# Patient Record
Sex: FEMALE | Race: WHITE | NOT HISPANIC OR LATINO
[De-identification: names, ages, dates, MRNs, and addresses within clinical notes are randomized per-mention and may not be internally consistent; named-entity substitution may affect disease eponyms.]

---

## 2017-01-31 ENCOUNTER — RX RENEWAL (OUTPATIENT)
Age: 62
End: 2017-01-31

## 2017-03-22 ENCOUNTER — MEDICATION RENEWAL (OUTPATIENT)
Age: 62
End: 2017-03-22

## 2017-03-23 ENCOUNTER — RX RENEWAL (OUTPATIENT)
Age: 62
End: 2017-03-23

## 2017-04-28 ENCOUNTER — RX RENEWAL (OUTPATIENT)
Age: 62
End: 2017-04-28

## 2017-10-30 ENCOUNTER — APPOINTMENT (OUTPATIENT)
Dept: INTERNAL MEDICINE | Facility: CLINIC | Age: 62
End: 2017-10-30

## 2017-12-12 ENCOUNTER — APPOINTMENT (OUTPATIENT)
Dept: INTERNAL MEDICINE | Facility: CLINIC | Age: 62
End: 2017-12-12
Payer: COMMERCIAL

## 2017-12-12 VITALS
HEIGHT: 59 IN | WEIGHT: 104 LBS | BODY MASS INDEX: 20.96 KG/M2 | HEART RATE: 79 BPM | DIASTOLIC BLOOD PRESSURE: 79 MMHG | SYSTOLIC BLOOD PRESSURE: 130 MMHG | OXYGEN SATURATION: 98 %

## 2017-12-12 PROCEDURE — 99396 PREV VISIT EST AGE 40-64: CPT

## 2017-12-20 LAB
25(OH)D3 SERPL-MCNC: 31 NG/ML
ALBUMIN SERPL ELPH-MCNC: 4.8 G/DL
ALP BLD-CCNC: 56 U/L
ALT SERPL-CCNC: 24 U/L
ANION GAP SERPL CALC-SCNC: 13 MMOL/L
AST SERPL-CCNC: 32 U/L
BASOPHILS # BLD AUTO: 0.02 K/UL
BASOPHILS NFR BLD AUTO: 0.2 %
BILIRUB SERPL-MCNC: 0.4 MG/DL
BUN SERPL-MCNC: 14 MG/DL
CALCIUM SERPL-MCNC: 9.7 MG/DL
CHLORIDE SERPL-SCNC: 99 MMOL/L
CHOLEST SERPL-MCNC: 200 MG/DL
CHOLEST/HDLC SERPL: 2.1 RATIO
CO2 SERPL-SCNC: 27 MMOL/L
CREAT SERPL-MCNC: 0.78 MG/DL
EOSINOPHIL # BLD AUTO: 0.07 K/UL
EOSINOPHIL NFR BLD AUTO: 0.9 %
GLUCOSE SERPL-MCNC: 85 MG/DL
HBA1C MFR BLD HPLC: 5.6 %
HCT VFR BLD CALC: 37.5 %
HDLC SERPL-MCNC: 96 MG/DL
HGB BLD-MCNC: 12.1 G/DL
IMM GRANULOCYTES NFR BLD AUTO: 0.1 %
LDLC SERPL CALC-MCNC: 90 MG/DL
LYMPHOCYTES # BLD AUTO: 3.4 K/UL
LYMPHOCYTES NFR BLD AUTO: 41.6 %
MAN DIFF?: NORMAL
MCHC RBC-ENTMCNC: 30.5 PG
MCHC RBC-ENTMCNC: 32.3 GM/DL
MCV RBC AUTO: 94.5 FL
MONOCYTES # BLD AUTO: 0.52 K/UL
MONOCYTES NFR BLD AUTO: 6.4 %
NEUTROPHILS # BLD AUTO: 4.16 K/UL
NEUTROPHILS NFR BLD AUTO: 50.8 %
PLATELET # BLD AUTO: 308 K/UL
POTASSIUM SERPL-SCNC: 4.7 MMOL/L
PROT SERPL-MCNC: 7.7 G/DL
RBC # BLD: 3.97 M/UL
RBC # FLD: 14.2 %
SODIUM SERPL-SCNC: 139 MMOL/L
T3 SERPL-MCNC: 93 NG/DL
T3FREE SERPL-MCNC: 2.95 PG/ML
T4 FREE SERPL-MCNC: 1.1 NG/DL
T4 SERPL-MCNC: 5.8 UG/DL
TRIGL SERPL-MCNC: 69 MG/DL
TSH SERPL-ACNC: 1.64 UIU/ML
WBC # FLD AUTO: 8.18 K/UL

## 2018-06-20 ENCOUNTER — MEDICATION RENEWAL (OUTPATIENT)
Age: 63
End: 2018-06-20

## 2018-10-20 ENCOUNTER — MOBILE ON CALL (OUTPATIENT)
Age: 63
End: 2018-10-20

## 2018-10-20 RX ORDER — ALBUTEROL SULFATE 90 UG/1
108 (90 BASE) AEROSOL, METERED RESPIRATORY (INHALATION)
Qty: 1 | Refills: 5 | Status: ACTIVE | COMMUNITY
Start: 2017-01-31 | End: 1900-01-01

## 2018-10-24 DIAGNOSIS — R92.8 OTHER ABNORMAL AND INCONCLUSIVE FINDINGS ON DIAGNOSTIC IMAGING OF BREAST: ICD-10-CM

## 2018-12-13 ENCOUNTER — FORM ENCOUNTER (OUTPATIENT)
Age: 63
End: 2018-12-13

## 2018-12-14 ENCOUNTER — APPOINTMENT (OUTPATIENT)
Dept: INTERNAL MEDICINE | Facility: CLINIC | Age: 63
End: 2018-12-14
Payer: COMMERCIAL

## 2018-12-14 ENCOUNTER — APPOINTMENT (OUTPATIENT)
Dept: RADIOLOGY | Facility: IMAGING CENTER | Age: 63
End: 2018-12-14
Payer: COMMERCIAL

## 2018-12-14 ENCOUNTER — OUTPATIENT (OUTPATIENT)
Dept: OUTPATIENT SERVICES | Facility: HOSPITAL | Age: 63
LOS: 1 days | End: 2018-12-14
Payer: COMMERCIAL

## 2018-12-14 VITALS
WEIGHT: 96 LBS | OXYGEN SATURATION: 99 % | DIASTOLIC BLOOD PRESSURE: 60 MMHG | HEIGHT: 59 IN | BODY MASS INDEX: 19.35 KG/M2 | SYSTOLIC BLOOD PRESSURE: 120 MMHG | HEART RATE: 62 BPM

## 2018-12-14 DIAGNOSIS — R63.4 ABNORMAL WEIGHT LOSS: ICD-10-CM

## 2018-12-14 DIAGNOSIS — J45.991 COUGH VARIANT ASTHMA: ICD-10-CM

## 2018-12-14 DIAGNOSIS — R63.0 ANOREXIA: ICD-10-CM

## 2018-12-14 DIAGNOSIS — R76.12 NONSPECIFIC REACTION TO CELL MEDIATED IMMUNITY MEASUREMENT OF GAMMA INTERFERON ANTIGEN RESPONSE W/OUT ACTIVE TUBERCULOSIS: ICD-10-CM

## 2018-12-14 DIAGNOSIS — R07.9 CHEST PAIN, UNSPECIFIED: ICD-10-CM

## 2018-12-14 PROCEDURE — G0444 DEPRESSION SCREEN ANNUAL: CPT

## 2018-12-14 PROCEDURE — G0009: CPT

## 2018-12-14 PROCEDURE — 71047 X-RAY EXAM CHEST 3 VIEWS: CPT | Mod: 26

## 2018-12-14 PROCEDURE — 99214 OFFICE O/P EST MOD 30 MIN: CPT | Mod: 25

## 2018-12-14 PROCEDURE — 99396 PREV VISIT EST AGE 40-64: CPT | Mod: 25

## 2018-12-14 PROCEDURE — G0442 ANNUAL ALCOHOL SCREEN 15 MIN: CPT

## 2018-12-14 PROCEDURE — 90732 PPSV23 VACC 2 YRS+ SUBQ/IM: CPT

## 2018-12-14 PROCEDURE — 71047 X-RAY EXAM CHEST 3 VIEWS: CPT

## 2018-12-16 ENCOUNTER — FORM ENCOUNTER (OUTPATIENT)
Age: 63
End: 2018-12-16

## 2018-12-17 ENCOUNTER — APPOINTMENT (OUTPATIENT)
Dept: ULTRASOUND IMAGING | Facility: IMAGING CENTER | Age: 63
End: 2018-12-17
Payer: COMMERCIAL

## 2018-12-17 ENCOUNTER — OUTPATIENT (OUTPATIENT)
Dept: OUTPATIENT SERVICES | Facility: HOSPITAL | Age: 63
LOS: 1 days | End: 2018-12-17
Payer: COMMERCIAL

## 2018-12-17 ENCOUNTER — APPOINTMENT (OUTPATIENT)
Dept: MAMMOGRAPHY | Facility: IMAGING CENTER | Age: 63
End: 2018-12-17
Payer: COMMERCIAL

## 2018-12-17 DIAGNOSIS — R92.8 OTHER ABNORMAL AND INCONCLUSIVE FINDINGS ON DIAGNOSTIC IMAGING OF BREAST: ICD-10-CM

## 2018-12-17 DIAGNOSIS — N60.19 DIFFUSE CYSTIC MASTOPATHY OF UNSPECIFIED BREAST: ICD-10-CM

## 2018-12-17 LAB
25(OH)D3 SERPL-MCNC: 26.4 NG/ML
ALBUMIN SERPL ELPH-MCNC: 4.6 G/DL
ALP BLD-CCNC: 54 U/L
ALT SERPL-CCNC: 16 U/L
ANION GAP SERPL CALC-SCNC: 9 MMOL/L
APPEARANCE: CLEAR
AST SERPL-CCNC: 23 U/L
BACTERIA: NEGATIVE
BASOPHILS # BLD AUTO: 0.01 K/UL
BASOPHILS NFR BLD AUTO: 0.2 %
BILIRUB SERPL-MCNC: 0.3 MG/DL
BILIRUBIN URINE: NEGATIVE
BLOOD URINE: NEGATIVE
BUN SERPL-MCNC: 13 MG/DL
CALCIUM SERPL-MCNC: 9.6 MG/DL
CHLORIDE SERPL-SCNC: 98 MMOL/L
CHOLEST SERPL-MCNC: 204 MG/DL
CHOLEST/HDLC SERPL: 2.1 RATIO
CO2 SERPL-SCNC: 27 MMOL/L
COLOR: YELLOW
CREAT SERPL-MCNC: 0.69 MG/DL
EOSINOPHIL # BLD AUTO: 0.2 K/UL
EOSINOPHIL NFR BLD AUTO: 3.2 %
GLUCOSE QUALITATIVE U: NEGATIVE MG/DL
GLUCOSE SERPL-MCNC: 103 MG/DL
HBA1C MFR BLD HPLC: 5.7 %
HBV SURFACE AB SER QL: NONREACTIVE
HBV SURFACE AG SER QL: NONREACTIVE
HCT VFR BLD CALC: 39.9 %
HCV AB SER QL: NONREACTIVE
HCV S/CO RATIO: 0.07 S/CO
HDLC SERPL-MCNC: 96 MG/DL
HGB BLD-MCNC: 12.9 G/DL
HIV1+2 AB SPEC QL IA.RAPID: NONREACTIVE
IMM GRANULOCYTES NFR BLD AUTO: 0.2 %
KETONES URINE: NEGATIVE
LDH SERPL-CCNC: 161 U/L
LDLC SERPL CALC-MCNC: 92 MG/DL
LEUKOCYTE ESTERASE URINE: NEGATIVE
LYMPHOCYTES # BLD AUTO: 2.56 K/UL
LYMPHOCYTES NFR BLD AUTO: 41.6 %
MAN DIFF?: NORMAL
MCHC RBC-ENTMCNC: 31.5 PG
MCHC RBC-ENTMCNC: 32.3 GM/DL
MCV RBC AUTO: 97.3 FL
MICROSCOPIC-UA: NORMAL
MONOCYTES # BLD AUTO: 0.4 K/UL
MONOCYTES NFR BLD AUTO: 6.5 %
NEUTROPHILS # BLD AUTO: 2.98 K/UL
NEUTROPHILS NFR BLD AUTO: 48.3 %
NITRITE URINE: NEGATIVE
PH URINE: 6.5
PLATELET # BLD AUTO: 315 K/UL
POTASSIUM SERPL-SCNC: 4.6 MMOL/L
PROT SERPL-MCNC: 7 G/DL
PROTEIN URINE: NEGATIVE MG/DL
RBC # BLD: 4.1 M/UL
RBC # FLD: 14 %
RED BLOOD CELLS URINE: 0 /HPF
SODIUM SERPL-SCNC: 134 MMOL/L
SPECIFIC GRAVITY URINE: 1.01
SQUAMOUS EPITHELIAL CELLS: 0 /HPF
T4 SERPL-MCNC: 5.7 UG/DL
TRIGL SERPL-MCNC: 79 MG/DL
TSH SERPL-ACNC: 1.77 UIU/ML
TSH SERPL-ACNC: 1.81 UIU/ML
UROBILINOGEN URINE: NEGATIVE MG/DL
VIT B12 SERPL-MCNC: 366 PG/ML
WBC # FLD AUTO: 6.16 K/UL
WHITE BLOOD CELLS URINE: 0 /HPF

## 2018-12-17 PROCEDURE — 76641 ULTRASOUND BREAST COMPLETE: CPT | Mod: 26,50

## 2018-12-17 PROCEDURE — 77067 SCR MAMMO BI INCL CAD: CPT | Mod: 26

## 2018-12-17 PROCEDURE — 77067 SCR MAMMO BI INCL CAD: CPT

## 2018-12-17 PROCEDURE — 77063 BREAST TOMOSYNTHESIS BI: CPT | Mod: 26

## 2018-12-17 PROCEDURE — 77063 BREAST TOMOSYNTHESIS BI: CPT

## 2018-12-17 PROCEDURE — 76641 ULTRASOUND BREAST COMPLETE: CPT

## 2018-12-20 PROBLEM — R63.0 LOSS OF APPETITE FOR MORE THAN 2 WEEKS: Status: ACTIVE | Noted: 2018-12-14

## 2018-12-20 PROBLEM — R07.9 CHEST PAIN: Status: ACTIVE | Noted: 2018-12-14

## 2018-12-20 PROBLEM — R76.12 POSITIVE QUANTIFERON-TB GOLD TEST: Status: ACTIVE | Noted: 2018-12-14

## 2018-12-20 NOTE — HISTORY OF PRESENT ILLNESS
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  Admits to increase stress traveling overseas and also dealing with her 20 yo daughter who had drug and alcohol problem.\par Hx of borderline HTN-begun on ARB/diuretic Feb 2016 but dizziness with lower BP, stopped medications and seeing cardiologist Dr Murphy at [facility], EST pending\par Hx of hyperlipidemia, on statin\par Hx hypothyroidism,  labs to check thyroid -normal\par Hx fibrocystic breast,  mammogram 4/2016-has appt 2018 \par Hx of colonic polyp with Dr Wyatt 2012-needs f/u colonoscopy 2015-check office if done\par s/p SANDRA for fibroids.\par Td 2006, Tdap 2016

## 2018-12-20 NOTE — HEALTH RISK ASSESSMENT
[No falls in past year] : Patient reported no falls in the past year [0] : 2) Feeling down, depressed, or hopeless: Not at all (0) [Discussed at today's visit] : Advance Directives Discussed at today's visit [] : No [[id number]] : 0 [MammogramDate] : 04/16 [ColonoscopyComments] : 2015-[facility]

## 2018-12-20 NOTE — ASSESSMENT
[FreeTextEntry1] : CXr done-no infiltrate but hyperlucent lung fields/positive quant gold/weight loos/cough\par refer to pulmonary\par sputum for AFB X 3\par refer to ROSENDO Edwards recent colonsocpy and consult

## 2018-12-20 NOTE — REVIEW OF SYSTEMS
[Recent Change In Weight] : ~T recent weight change [Cough] : cough [Negative] : Heme/Lymph [Shortness Of Breath] : no shortness of breath [Wheezing] : no wheezing

## 2018-12-27 LAB
BACTERIA SPT CULT: NORMAL
M TB IFN-G BLD-IMP: NEGATIVE
QUANTIFERON TB PLUS MITOGEN MINUS NIL: 7.36 IU/ML
QUANTIFERON TB PLUS NIL: 0.11 IU/ML
QUANTIFERON TB PLUS TB1 MINUS NIL: 0.04 IU/ML
QUANTIFERON TB PLUS TB2 MINUS NIL: 0.05 IU/ML

## 2019-01-04 ENCOUNTER — APPOINTMENT (OUTPATIENT)
Dept: INTERNAL MEDICINE | Facility: CLINIC | Age: 64
End: 2019-01-04
Payer: COMMERCIAL

## 2019-01-04 VITALS
HEART RATE: 64 BPM | BODY MASS INDEX: 19.56 KG/M2 | DIASTOLIC BLOOD PRESSURE: 60 MMHG | WEIGHT: 97 LBS | SYSTOLIC BLOOD PRESSURE: 130 MMHG | HEIGHT: 59 IN | OXYGEN SATURATION: 98 %

## 2019-01-04 DIAGNOSIS — J45.991 COUGH VARIANT ASTHMA: ICD-10-CM

## 2019-01-04 PROCEDURE — 99214 OFFICE O/P EST MOD 30 MIN: CPT

## 2019-01-04 RX ORDER — ZOSTER VACCINE RECOMBINANT, ADJUVANTED 50 MCG/0.5
50 KIT INTRAMUSCULAR
Qty: 1 | Refills: 0 | Status: ACTIVE | COMMUNITY
Start: 2019-01-04 | End: 1900-01-01

## 2019-01-04 NOTE — REVIEW OF SYSTEMS
[Recent Change In Weight] : ~T recent weight change [Shortness Of Breath] : no shortness of breath [Cough] : cough [Negative] : Cardiovascular [FreeTextEntry6] : improved cough

## 2019-01-04 NOTE — HISTORY OF PRESENT ILLNESS
[de-identified] : Here with  to review labs, CXR-copy given to pt today. He is aware also that she lost weight in past year.\par Aware labs are in normal range.\par Still notes appettie has been reduced in past year or more with 20 ln weight loss noted.\par Was advosed to make appt with GI for colonosovcpy (dr Wyatt) but did not make appt yet.\par Cough is improved, with intiial AFB negative, and culture also not diagnostic. Has referral to see pulmonary. nonsmoker

## 2019-01-04 NOTE — PHYSICAL EXAM
[No Acute Distress] : no acute distress [Normal Outer Ear/Nose] : the outer ears and nose were normal in appearance [No JVD] : no jugular venous distention [No Lymphadenopathy] : no lymphadenopathy [No Respiratory Distress] : no respiratory distress  [Clear to Auscultation] : lungs were clear to auscultation bilaterally [Normal Rate] : normal rate  [de-identified] : slender female

## 2019-02-05 ENCOUNTER — APPOINTMENT (OUTPATIENT)
Dept: PULMONOLOGY | Facility: CLINIC | Age: 64
End: 2019-02-05
Payer: COMMERCIAL

## 2019-02-05 ENCOUNTER — LABORATORY RESULT (OUTPATIENT)
Age: 64
End: 2019-02-05

## 2019-02-05 VITALS
BODY MASS INDEX: 18.95 KG/M2 | OXYGEN SATURATION: 98 % | SYSTOLIC BLOOD PRESSURE: 140 MMHG | HEART RATE: 72 BPM | TEMPERATURE: 97.7 F | RESPIRATION RATE: 16 BRPM | HEIGHT: 59 IN | WEIGHT: 94 LBS | DIASTOLIC BLOOD PRESSURE: 70 MMHG

## 2019-02-05 DIAGNOSIS — R63.4 ABNORMAL WEIGHT LOSS: ICD-10-CM

## 2019-02-05 DIAGNOSIS — J45.40 MODERATE PERSISTENT ASTHMA, UNCOMPLICATED: ICD-10-CM

## 2019-02-05 PROCEDURE — ZZZZZ: CPT

## 2019-02-05 PROCEDURE — 94060 EVALUATION OF WHEEZING: CPT

## 2019-02-05 PROCEDURE — 94726 PLETHYSMOGRAPHY LUNG VOLUMES: CPT

## 2019-02-05 PROCEDURE — 99244 OFF/OP CNSLTJ NEW/EST MOD 40: CPT | Mod: 25

## 2019-02-05 PROCEDURE — 94729 DIFFUSING CAPACITY: CPT

## 2019-02-06 PROBLEM — J45.40 MODERATE PERSISTENT ASTHMA, UNSPECIFIED WHETHER COMPLICATED: Status: ACTIVE | Noted: 2019-02-06

## 2019-02-06 PROBLEM — R63.4 ABNORMAL LOSS OF WEIGHT: Status: ACTIVE | Noted: 2018-12-14

## 2019-02-06 LAB
BASOPHILS # BLD AUTO: 0.02 K/UL
BASOPHILS NFR BLD AUTO: 0.2 %
EOSINOPHIL # BLD AUTO: 0.16 K/UL
EOSINOPHIL NFR BLD AUTO: 1.9 %
HCT VFR BLD CALC: 37.8 %
HGB BLD-MCNC: 12.4 G/DL
IMM GRANULOCYTES NFR BLD AUTO: 0.1 %
LYMPHOCYTES # BLD AUTO: 2.91 K/UL
LYMPHOCYTES NFR BLD AUTO: 34.2 %
MAN DIFF?: NORMAL
MCHC RBC-ENTMCNC: 30.5 PG
MCHC RBC-ENTMCNC: 32.8 GM/DL
MCV RBC AUTO: 92.9 FL
MONOCYTES # BLD AUTO: 0.51 K/UL
MONOCYTES NFR BLD AUTO: 6 %
NEUTROPHILS # BLD AUTO: 4.9 K/UL
NEUTROPHILS NFR BLD AUTO: 57.6 %
PLATELET # BLD AUTO: 375 K/UL
RBC # BLD: 4.07 M/UL
RBC # FLD: 13.9 %
WBC # FLD AUTO: 8.51 K/UL

## 2019-02-06 RX ORDER — ALBUTEROL SULFATE 90 UG/1
108 (90 BASE) AEROSOL, METERED RESPIRATORY (INHALATION)
Refills: 0 | Status: ACTIVE | COMMUNITY

## 2019-02-06 NOTE — HISTORY OF PRESENT ILLNESS
[FreeTextEntry1] : 63 year old woman with history of asthma in childhood until age 20.  Frequently hospitalized as a child.  Was well for the next several years until a few years ago.  She was born in [place] and moved to the  in 1986 when she was 31.  Family cooked on indoor wood burning stove when she was a child. \par \par 5 years ago the asthma became active again.  Occasionally has trouble during the day where she has to use the inhaler.  Frequently wakes up in the middle of the night and has to use the inhaler.  Then she is fine.  Denies  heartburn.  Has had the dog for 15 years now for the last year the dog is sleeping in their bedroom. Has allergy to cats.  \par \par Has 3 cats and a dog plus parakeets at home.  \par \par She has lost 20 pounds in the last two years- states she has no appetite. Quantiferon gold was positive  in 2013 but recen tQuantiferon gold is negative.  She has noinfectious symptoms.  2 sputum for AFB are negative.  Referred because of cough, weight loss and prior history of positive quantiferon gold. \par \par

## 2019-02-06 NOTE — PHYSICAL EXAM
[General Appearance - Well Developed] : well developed [Normal Appearance] : normal appearance [Well Groomed] : well groomed [General Appearance - Well Nourished] : well nourished [No Deformities] : no deformities [General Appearance - In No Acute Distress] : no acute distress [Normal Conjunctiva] : the conjunctiva exhibited no abnormalities [Eyelids - No Xanthelasma] : the eyelids demonstrated no xanthelasmas [I] : I [Neck Appearance] : the appearance of the neck was normal [Neck Cervical Mass (___cm)] : no neck mass was observed [Jugular Venous Distention Increased] : there was no jugular-venous distention [Thyroid Diffuse Enlargement] : the thyroid was not enlarged [Thyroid Nodule] : there were no palpable thyroid nodules [Heart Rate And Rhythm] : heart rate and rhythm were normal [Heart Sounds] : normal S1 and S2 [Murmurs] : no murmurs present [Respiration, Rhythm And Depth] : normal respiratory rhythm and effort [Exaggerated Use Of Accessory Muscles For Inspiration] : no accessory muscle use [Scattered Wheezes] : scattered wheezing [Bibasilar Rales/Crackles] : bibasilar rales [Abnormal Walk] : normal gait [Gait - Sufficient For Exercise Testing] : the gait was sufficient for exercise testing [Nail Clubbing] : no clubbing of the fingernails [Cyanosis, Localized] : no localized cyanosis [Petechial Hemorrhages (___cm)] : no petechial hemorrhages [Skin Color & Pigmentation] : normal skin color and pigmentation [Skin Turgor] : normal skin turgor [] : no rash [No Focal Deficits] : no focal deficits [Oriented To Time, Place, And Person] : oriented to person, place, and time [Impaired Insight] : insight and judgment were intact [Affect] : the affect was normal

## 2019-02-06 NOTE — REVIEW OF SYSTEMS
[Recent Wt Loss (___ Lbs)] : recent [unfilled] ~Ulb weight loss [Nasal Congestion] : nasal congestion [Sinus Problems] : sinus problems [As Noted in HPI] : as noted in HPI [Hay Fever] : hay fever [Nasal Discharge] : nasal discharge [Arthralgias] : arthralgias [Thyroid Problem] : thyroid problem [Difficulty Maintaining Sleep] : difficulty maintaining sleep [Snoring] : snoring [Negative] : Neurologic [Poor Appetite] : poor appetite [Ear Disturbance] : ear disturbance [Hypertension] : ~T hypertension [Diabetes] : diabetes mellitus [Fever] : no fever [Chills] : no chills [Hives] : no urticaria was observed [Heartburn] : no heartburn [Difficulty Initiating Sleep] : no difficulty falling asleep [Witnessed Apneas] : demonstrated no ~M apnea [Hypersomnolence] : not sleeping much more than usual [FreeTextEntry3] : poor appetite

## 2019-02-06 NOTE — ASSESSMENT
[FreeTextEntry1] : 63 year old woman with history of asthma during childhood who presents for evaluation of her asthma which has become active again in the last 5 years.  She has also had unexplained weight loss of 20 lbs over 2 years and has a history of positive quantiferon gold, which has recently been tested an is negative.  She is experiencing active cough, wheezing and chest discomfort and is using proair on a dailly basis.  \par \par On PE, VSS. Oxygen saturation at rest at room air is 98%.  BMI is 18  Lung exam notable for scattered wheezes, inspiratory squeaks and rales.  Cor is RRR with normal S1S2.  Extremities are without edema.\par \par PFTs today reveal severe obstructive impairment with evidence of airtrapping, and positive bronchodilator response.  DLCO is mildly reduced.\par \par Imp: Asthma is poorly controlled.  She is exposed to several triggers, incuding Pets that she is allergic to, GERD. In addition she is losing weight.  Would like to rule out infectious, neoplastic etiologies.\par Plan: \par Start Advair twice daily with proair as needed  \par - Asthma RAST, CBC, IgE\par -Chest CT to rule out parenchymal lung disease.\par F/U after above tests.

## 2019-02-10 LAB
ACID FAST STN SPT: NORMAL
ACID FAST STN SPT: NORMAL

## 2019-02-15 LAB
A ALTERNATA IGE QN: <0.1 KUA/L
A FUMIGATUS IGE QN: <0.1 KUA/L
C ALBICANS IGE QN: <0.1 KUA/L
C HERBARUM IGE QN: <0.1 KUA/L
CAT DANDER IGE QN: 4.26 KUA/L
COMMON RAGWEED IGE QN: 0.12 KUA/L
D FARINAE IGE QN: 2.14 KUA/L
D PTERONYSS IGE QN: 1.58 KUA/L
DEPRECATED A ALTERNATA IGE RAST QL: 0
DEPRECATED A FUMIGATUS IGE RAST QL: 0
DEPRECATED C ALBICANS IGE RAST QL: 0
DEPRECATED C HERBARUM IGE RAST QL: 0
DEPRECATED CAT DANDER IGE RAST QL: ABNORMAL
DEPRECATED COMMON RAGWEED IGE RAST QL: NORMAL
DEPRECATED D FARINAE IGE RAST QL: ABNORMAL
DEPRECATED D PTERONYSS IGE RAST QL: ABNORMAL
DEPRECATED DOG DANDER IGE RAST QL: ABNORMAL
DEPRECATED M RACEMOSUS IGE RAST QL: 0
DEPRECATED ROACH IGE RAST QL: 0
DEPRECATED TIMOTHY IGE RAST QL: ABNORMAL
DEPRECATED WHITE OAK IGE RAST QL: ABNORMAL
DOG DANDER IGE QN: 0.37 KUA/L
M RACEMOSUS IGE QN: <0.1 KUA/L
ROACH IGE QN: <0.1 KUA/L
TIMOTHY IGE QN: 1.29 KUA/L
WHITE OAK IGE QN: 12.8 KUA/L

## 2019-03-04 ENCOUNTER — APPOINTMENT (OUTPATIENT)
Dept: CT IMAGING | Facility: IMAGING CENTER | Age: 64
End: 2019-03-04

## 2019-03-11 ENCOUNTER — APPOINTMENT (OUTPATIENT)
Dept: CT IMAGING | Facility: IMAGING CENTER | Age: 64
End: 2019-03-11

## 2019-04-27 ENCOUNTER — TRANSCRIPTION ENCOUNTER (OUTPATIENT)
Age: 64
End: 2019-04-27

## 2019-05-20 ENCOUNTER — MEDICATION RENEWAL (OUTPATIENT)
Age: 64
End: 2019-05-20

## 2019-08-20 ENCOUNTER — MEDICATION RENEWAL (OUTPATIENT)
Age: 64
End: 2019-08-20

## 2019-11-01 ENCOUNTER — MEDICATION RENEWAL (OUTPATIENT)
Age: 64
End: 2019-11-01

## 2020-03-12 ENCOUNTER — RX RENEWAL (OUTPATIENT)
Age: 65
End: 2020-03-12

## 2020-04-14 ENCOUNTER — RX RENEWAL (OUTPATIENT)
Age: 65
End: 2020-04-14

## 2020-07-14 ENCOUNTER — RX RENEWAL (OUTPATIENT)
Age: 65
End: 2020-07-14

## 2020-12-09 ENCOUNTER — APPOINTMENT (OUTPATIENT)
Dept: INTERNAL MEDICINE | Facility: CLINIC | Age: 65
End: 2020-12-09

## 2020-12-28 ENCOUNTER — TRANSCRIPTION ENCOUNTER (OUTPATIENT)
Age: 65
End: 2020-12-28

## 2021-01-04 DIAGNOSIS — Z23 ENCOUNTER FOR IMMUNIZATION: ICD-10-CM

## 2021-02-19 ENCOUNTER — APPOINTMENT (OUTPATIENT)
Dept: INTERNAL MEDICINE | Facility: CLINIC | Age: 66
End: 2021-02-19

## 2021-04-20 ENCOUNTER — APPOINTMENT (OUTPATIENT)
Dept: INTERNAL MEDICINE | Facility: CLINIC | Age: 66
End: 2021-04-20
Payer: COMMERCIAL

## 2021-04-20 VITALS
WEIGHT: 103 LBS | HEART RATE: 64 BPM | SYSTOLIC BLOOD PRESSURE: 150 MMHG | OXYGEN SATURATION: 98 % | DIASTOLIC BLOOD PRESSURE: 70 MMHG | HEIGHT: 59 IN | BODY MASS INDEX: 20.76 KG/M2

## 2021-04-20 DIAGNOSIS — Z83.3 FAMILY HISTORY OF DIABETES MELLITUS: ICD-10-CM

## 2021-04-20 PROCEDURE — 99072 ADDL SUPL MATRL&STAF TM PHE: CPT

## 2021-04-20 PROCEDURE — G0444 DEPRESSION SCREEN ANNUAL: CPT

## 2021-04-20 PROCEDURE — 99397 PER PM REEVAL EST PAT 65+ YR: CPT

## 2021-04-20 PROCEDURE — G0442 ANNUAL ALCOHOL SCREEN 15 MIN: CPT

## 2021-04-22 NOTE — HISTORY OF PRESENT ILLNESS
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  Admits to increase stress traveling overseas and also dealing with her 18 yo daughter who had drug and alcohol problem.\par Hx of borderline HTN-begun on ARB/diuretic Feb 2016 but dizziness with lower BP, stopped medications and seeing cardiologist Dr Murphy at [facility], EST pending\par Hx of hyperlipidemia, on statin\par Hx hypothyroidism,  labs to check thyroid -normal\par Hx fibrocystic breast,  mammogram 4/2016-has appt 2018 \par Hx of colonic polyp with Dr Wyatt 2012-needs f/u colonoscopy 2015-check office if done\par s/p SANDRA for fibroids.\par Td 2006, Tdap 2016

## 2021-04-22 NOTE — HEALTH RISK ASSESSMENT
[No falls in past year] : Patient reported no falls in the past year [0] : 2) Feeling down, depressed, or hopeless: Not at all (0) [Discussed at today's visit] : Advance Directives Discussed at today's visit [] : No [2 - 3 times a week (3 pts)] : 2 - 3  times a week (3 points) [de-identified] : Rriiday and Sat(2-3 drinks on weekend) [Audit-CScore] : 3 [[id number]] : 0 [MammogramDate] : 12/18 [[facility] [FreeTextEntry4] : Has living will

## 2021-04-22 NOTE — ASSESSMENT
[FreeTextEntry1] : CXR done-no infiltrate but hyperlucent lung fields/positive quant gold/weight loss/cough\par refer to pulmonary\par sputum for AFB X 3\par refer to ROSENDO Edwards recent colonsocpy and consult

## 2021-05-01 RX ORDER — FLUTICASONE PROPIONATE AND SALMETEROL 250; 50 UG/1; UG/1
250-50 POWDER RESPIRATORY (INHALATION)
Qty: 1 | Refills: 3 | Status: ACTIVE | COMMUNITY
Start: 2019-02-05 | End: 1900-01-01

## 2021-05-02 LAB
25(OH)D3 SERPL-MCNC: 20.9 NG/ML
ALBUMIN SERPL ELPH-MCNC: 4.8 G/DL
ALP BLD-CCNC: 75 U/L
ALT SERPL-CCNC: 16 U/L
ANION GAP SERPL CALC-SCNC: 13 MMOL/L
AST SERPL-CCNC: 25 U/L
BASOPHILS # BLD AUTO: 0.03 K/UL
BASOPHILS NFR BLD AUTO: 0.5 %
BILIRUB SERPL-MCNC: 0.4 MG/DL
BUN SERPL-MCNC: 9 MG/DL
CALCIUM SERPL-MCNC: 10.4 MG/DL
CHLORIDE SERPL-SCNC: 97 MMOL/L
CHOLEST SERPL-MCNC: 281 MG/DL
CO2 SERPL-SCNC: 26 MMOL/L
COVID-19 SPIKE DOMAIN ANTIBODY INTERPRETATION: POSITIVE
CREAT SERPL-MCNC: 0.69 MG/DL
EOSINOPHIL # BLD AUTO: 0.1 K/UL
EOSINOPHIL NFR BLD AUTO: 1.5 %
ESTIMATED AVERAGE GLUCOSE: 108 MG/DL
GLUCOSE SERPL-MCNC: 108 MG/DL
HBA1C MFR BLD HPLC: 5.4 %
HCT VFR BLD CALC: 39.2 %
HDLC SERPL-MCNC: 104 MG/DL
HGB BLD-MCNC: 13 G/DL
IMM GRANULOCYTES NFR BLD AUTO: 0.5 %
LDLC SERPL CALC-MCNC: 162 MG/DL
LYMPHOCYTES # BLD AUTO: 2.29 K/UL
LYMPHOCYTES NFR BLD AUTO: 34.7 %
MAN DIFF?: NORMAL
MCHC RBC-ENTMCNC: 31.6 PG
MCHC RBC-ENTMCNC: 33.2 GM/DL
MCV RBC AUTO: 95.4 FL
MONOCYTES # BLD AUTO: 0.43 K/UL
MONOCYTES NFR BLD AUTO: 6.5 %
NEUTROPHILS # BLD AUTO: 3.72 K/UL
NEUTROPHILS NFR BLD AUTO: 56.3 %
NONHDLC SERPL-MCNC: 177 MG/DL
PLATELET # BLD AUTO: 372 K/UL
POTASSIUM SERPL-SCNC: 4.9 MMOL/L
PROT SERPL-MCNC: 7.5 G/DL
RBC # BLD: 4.11 M/UL
RBC # FLD: 13.8 %
SARS-COV-2 AB SERPL IA-ACNC: >250 U/ML
SODIUM SERPL-SCNC: 135 MMOL/L
TRIGL SERPL-MCNC: 73 MG/DL
TSH SERPL-ACNC: 2.12 UIU/ML
WBC # FLD AUTO: 6.6 K/UL

## 2021-05-19 ENCOUNTER — APPOINTMENT (OUTPATIENT)
Dept: RADIOLOGY | Facility: IMAGING CENTER | Age: 66
End: 2021-05-19
Payer: COMMERCIAL

## 2021-05-19 ENCOUNTER — OUTPATIENT (OUTPATIENT)
Dept: OUTPATIENT SERVICES | Facility: HOSPITAL | Age: 66
LOS: 1 days | End: 2021-05-19
Payer: COMMERCIAL

## 2021-05-19 ENCOUNTER — RESULT REVIEW (OUTPATIENT)
Age: 66
End: 2021-05-19

## 2021-05-19 ENCOUNTER — APPOINTMENT (OUTPATIENT)
Dept: MAMMOGRAPHY | Facility: IMAGING CENTER | Age: 66
End: 2021-05-19
Payer: COMMERCIAL

## 2021-05-19 DIAGNOSIS — Z00.8 ENCOUNTER FOR OTHER GENERAL EXAMINATION: ICD-10-CM

## 2021-05-19 DIAGNOSIS — Z00.00 ENCOUNTER FOR GENERAL ADULT MEDICAL EXAMINATION WITHOUT ABNORMAL FINDINGS: ICD-10-CM

## 2021-05-19 DIAGNOSIS — N60.19 DIFFUSE CYSTIC MASTOPATHY OF UNSPECIFIED BREAST: ICD-10-CM

## 2021-05-19 PROCEDURE — 77080 DXA BONE DENSITY AXIAL: CPT | Mod: 26

## 2021-05-19 PROCEDURE — 77067 SCR MAMMO BI INCL CAD: CPT

## 2021-05-19 PROCEDURE — 77067 SCR MAMMO BI INCL CAD: CPT | Mod: 26

## 2021-05-19 PROCEDURE — 77063 BREAST TOMOSYNTHESIS BI: CPT | Mod: 26

## 2021-05-19 PROCEDURE — 77063 BREAST TOMOSYNTHESIS BI: CPT

## 2021-05-19 PROCEDURE — 77080 DXA BONE DENSITY AXIAL: CPT

## 2021-05-24 ENCOUNTER — APPOINTMENT (OUTPATIENT)
Dept: INTERNAL MEDICINE | Facility: CLINIC | Age: 66
End: 2021-05-24

## 2021-05-26 ENCOUNTER — RESULT REVIEW (OUTPATIENT)
Age: 66
End: 2021-05-26

## 2021-05-26 ENCOUNTER — APPOINTMENT (OUTPATIENT)
Dept: ULTRASOUND IMAGING | Facility: IMAGING CENTER | Age: 66
End: 2021-05-26
Payer: COMMERCIAL

## 2021-05-26 ENCOUNTER — APPOINTMENT (OUTPATIENT)
Dept: MAMMOGRAPHY | Facility: IMAGING CENTER | Age: 66
End: 2021-05-26
Payer: COMMERCIAL

## 2021-05-26 ENCOUNTER — OUTPATIENT (OUTPATIENT)
Dept: OUTPATIENT SERVICES | Facility: HOSPITAL | Age: 66
LOS: 1 days | End: 2021-05-26
Payer: COMMERCIAL

## 2021-05-26 DIAGNOSIS — Z00.8 ENCOUNTER FOR OTHER GENERAL EXAMINATION: ICD-10-CM

## 2021-05-26 PROCEDURE — 76642 ULTRASOUND BREAST LIMITED: CPT

## 2021-05-26 PROCEDURE — 77065 DX MAMMO INCL CAD UNI: CPT

## 2021-05-26 PROCEDURE — G0279: CPT | Mod: 26

## 2021-05-26 PROCEDURE — 76642 ULTRASOUND BREAST LIMITED: CPT | Mod: 26,RT

## 2021-05-26 PROCEDURE — G0279: CPT

## 2021-05-26 PROCEDURE — 77065 DX MAMMO INCL CAD UNI: CPT | Mod: 26,RT

## 2021-05-27 ENCOUNTER — APPOINTMENT (OUTPATIENT)
Dept: INTERNAL MEDICINE | Facility: CLINIC | Age: 66
End: 2021-05-27
Payer: COMMERCIAL

## 2021-05-27 PROCEDURE — 99443: CPT

## 2021-06-01 NOTE — ASSESSMENT
[FreeTextEntry1] : 65 year-old female with history of hyperlipidemia postmenopausal who requests follow-up via telehealth to review bone density study that was done on May 19, 2021.  She has no prior bone density done of note results show in the spine T score of -2.7 consistent with osteoporosis.  Hip also shows a femoral neck T score -2.6 also consistent with osteoporosis of the total hip is -1.4 osteopenic.  Overall impression is osteoporosis.  Fracture risk is above average.  Medical therapy to be considered.\par Discussed in depth the use of alendronate ( Fosamax).  Risk and benefits of leisure discussed.  Labs reviewed and indicate no vitamin D over the years as patient would forget to take vitamin D.  Recommend weightbearing exercises also the patient likes being sedentary.

## 2021-06-08 ENCOUNTER — APPOINTMENT (OUTPATIENT)
Dept: INTERNAL MEDICINE | Facility: CLINIC | Age: 66
End: 2021-06-08

## 2021-07-20 ENCOUNTER — APPOINTMENT (OUTPATIENT)
Dept: INTERNAL MEDICINE | Facility: CLINIC | Age: 66
End: 2021-07-20
Payer: COMMERCIAL

## 2021-07-20 VITALS
SYSTOLIC BLOOD PRESSURE: 130 MMHG | HEIGHT: 59 IN | HEART RATE: 69 BPM | OXYGEN SATURATION: 98 % | BODY MASS INDEX: 20.56 KG/M2 | WEIGHT: 102 LBS | DIASTOLIC BLOOD PRESSURE: 56 MMHG

## 2021-07-20 PROCEDURE — 99212 OFFICE O/P EST SF 10 MIN: CPT

## 2021-08-02 NOTE — HISTORY OF PRESENT ILLNESS
[de-identified] : Here to follow-up hypertension.  Was started on lisinopril 2.5 mg in April 2021. s elevated top systolic numbers seems to be low more elf-monitoring her blood pressure. Patient feels well , no palpitations, chest pain ,dizziness or lightheadedness .\par Blood pressure seems lower 130/50 6060 on exam

## 2021-10-09 ENCOUNTER — TRANSCRIPTION ENCOUNTER (OUTPATIENT)
Age: 66
End: 2021-10-09

## 2021-11-23 ENCOUNTER — TRANSCRIPTION ENCOUNTER (OUTPATIENT)
Age: 66
End: 2021-11-23

## 2021-11-30 ENCOUNTER — APPOINTMENT (OUTPATIENT)
Dept: PULMONOLOGY | Facility: CLINIC | Age: 66
End: 2021-11-30

## 2021-12-08 ENCOUNTER — APPOINTMENT (OUTPATIENT)
Dept: PULMONOLOGY | Facility: CLINIC | Age: 66
End: 2021-12-08
Payer: COMMERCIAL

## 2021-12-08 ENCOUNTER — MED ADMIN CHARGE (OUTPATIENT)
Age: 66
End: 2021-12-08

## 2021-12-08 VITALS
RESPIRATION RATE: 18 BRPM | HEIGHT: 59 IN | BODY MASS INDEX: 20.16 KG/M2 | HEART RATE: 93 BPM | DIASTOLIC BLOOD PRESSURE: 74 MMHG | TEMPERATURE: 98.3 F | WEIGHT: 100 LBS | SYSTOLIC BLOOD PRESSURE: 196 MMHG

## 2021-12-08 PROCEDURE — 90732 PPSV23 VACC 2 YRS+ SUBQ/IM: CPT

## 2021-12-08 PROCEDURE — 99214 OFFICE O/P EST MOD 30 MIN: CPT | Mod: 25

## 2021-12-08 PROCEDURE — 90472 IMMUNIZATION ADMIN EACH ADD: CPT

## 2021-12-08 PROCEDURE — G0009: CPT

## 2021-12-08 PROCEDURE — 90662 IIV NO PRSV INCREASED AG IM: CPT

## 2021-12-08 NOTE — HISTORY OF PRESENT ILLNESS
[FreeTextEntry1] : 66 year old woman with history of asthma in childhood until age 20.  Frequently hospitalized as a child.  Was well for the next several years until a few years ago.  She was born in [place] and [place] in [place] when she was 31.  Family cooked on indoor wood burning stove when she was a child. \par \par Has 3 cats and a dog plus parakeets at home.  Has had the dog for 15 years now for the last year the dog is sleeping in their bedroom. Has allergy to cats.  \par \par .I initially evaluated her in 2/2019.  She has lost 20 pounds in the last two years- states she has no appetite. Quantiferon gold was positive  in 2013 but recen tQuantiferon gold is negative.  She has noinfectious symptoms.  2 sputum for AFB are negative.  Referred because of cough, weight loss and prior history of positive quantiferon gold. \par Allergy panel in 2019 showed reactivity to oak tree, cat dander, and mildly to dog Class I. IgE was minimally elevated.  There was no eosinophilia.\par PFTs in 2019 showed severe obstruction with positive bronchodilator response and mildly reduced DLCO.\par Chest CT did not show bronchiectasis or lung nodules, and no adenopathy\par \par I started Advair at that time.\par \par Returns for follow up today,\par \par experiencing cough for 2 months, mostly dry,  does not know if she wheezes.  Has some exertional dyspnea. \par Using Wixela twice daily.  Occasional use of albuterol.\par has not received influenza vaccine\par Had covid booster.  \par

## 2021-12-08 NOTE — ASSESSMENT
[FreeTextEntry1] : 66 year old woman with history of asthma during childhood who presents for evaluation of her asthma which has become active again in the last few years.  She was initially evaluated by me in 2019.  Currently returns complaining of persistent cough, despite use of Wixela twice daily.  She is on an ACE inhibitor- lisinopril and lives in a home with 3 cats to which she is allergic.  \par \par Lungs are clear on exam today without wheezing.  VSS.  There is no peripheral edema.  \par \par PFTs in past have revealed severe obstructive impairment with evidence of airtrapping, and positive bronchodilator response.  DLCO  mildly reduced.\par \par Imp: Persistent cough in a patient with moderately severe obstructive asthma.   She has persistent allergic triggers in her environemnt( cats) and is also on an ACE I which can contribute to the cough.  WEight is now stable.  Previous CT in 2019 did not show evidence of bronchiectasis or parenchymal lung disease.  she had mild biapical scarring.  Previous RAST in 2019 showed allergy to cat (III), dog (I), oak (III) aaliyah grass (II) and dustmite (II) with mildly elevated IgE (105) There was no eosinophilia on peripheral smear.  \par \par Plan:\par 1- continue Wixela and albuterol as needed\par 2- would recommend trial of stopping ACE I\par 3- discussed modifying her allergic triggers, referral to allergy/immunology\par 4- repeat PFTs.

## 2021-12-22 ENCOUNTER — TRANSCRIPTION ENCOUNTER (OUTPATIENT)
Age: 66
End: 2021-12-22

## 2021-12-27 ENCOUNTER — APPOINTMENT (OUTPATIENT)
Dept: DISASTER EMERGENCY | Facility: CLINIC | Age: 66
End: 2021-12-27

## 2021-12-27 DIAGNOSIS — Z01.818 ENCOUNTER FOR OTHER PREPROCEDURAL EXAMINATION: ICD-10-CM

## 2021-12-30 ENCOUNTER — NON-APPOINTMENT (OUTPATIENT)
Age: 66
End: 2021-12-30

## 2021-12-30 ENCOUNTER — APPOINTMENT (OUTPATIENT)
Dept: PULMONOLOGY | Facility: CLINIC | Age: 66
End: 2021-12-30

## 2022-01-15 ENCOUNTER — TRANSCRIPTION ENCOUNTER (OUTPATIENT)
Age: 67
End: 2022-01-15

## 2022-01-27 ENCOUNTER — NON-APPOINTMENT (OUTPATIENT)
Age: 67
End: 2022-01-27

## 2022-03-02 NOTE — HISTORY OF PRESENT ILLNESS
[FreeTextEntry1] : 66 year old woman with history of asthma in childhood until age 20.  Frequently hospitalized as a child.  Was well for the next several years until a few years ago.  She was born in [place] and [place] in [place] when she was 31.  Family cooked on indoor wood burning stove when she was a child. \par \par Has 3 cats and a dog plus parakeets at home.  Has had the dog for 15 years now for the last year the dog is sleeping in their bedroom. Has allergy to cats.  \par \par .I initially evaluated her in 2/2019.  She has lost 20 pounds in the last two years- states she has no appetite. Quantiferon gold was positive  in 2013 but recen tQuantiferon gold is negative.  She has noinfectious symptoms.  2 sputum for AFB are negative.  Referred because of cough, weight loss and prior history of positive quantiferon gold. \par Allergy panel in 2019 showed reactivity to oak tree, cat dander, and mildly to dog Class I. IgE was minimally elevated.  There was no eosinophilia.\par PFTs in 2019 showed severe obstruction with positive bronchodilator response and mildly reduced DLCO.\par Chest CT did not show bronchiectasis or lung nodules, and no adenopathy\par \par I started Advair at that time.\par \par PFTS in 2/2019 showed severe obstruction with marked bronchodilator response.  FEV1 was 0.88 ( 44% predicted)\par Follow up 12/2021-experiencing cough, We discussed her persistent allergic triggers and I suggested she see an allergist.  I also recommended that she stop her ACE- I.  \par \par Returns for follow up today.  \par

## 2022-03-03 ENCOUNTER — APPOINTMENT (OUTPATIENT)
Dept: PULMONOLOGY | Facility: CLINIC | Age: 67
End: 2022-03-03

## 2022-03-14 ENCOUNTER — APPOINTMENT (OUTPATIENT)
Dept: INTERNAL MEDICINE | Facility: CLINIC | Age: 67
End: 2022-03-14

## 2022-06-24 ENCOUNTER — APPOINTMENT (OUTPATIENT)
Dept: MAMMOGRAPHY | Facility: IMAGING CENTER | Age: 67
End: 2022-06-24
Payer: COMMERCIAL

## 2022-06-24 ENCOUNTER — APPOINTMENT (OUTPATIENT)
Dept: ULTRASOUND IMAGING | Facility: IMAGING CENTER | Age: 67
End: 2022-06-24
Payer: COMMERCIAL

## 2022-06-24 ENCOUNTER — RESULT REVIEW (OUTPATIENT)
Age: 67
End: 2022-06-24

## 2022-06-24 ENCOUNTER — OUTPATIENT (OUTPATIENT)
Dept: OUTPATIENT SERVICES | Facility: HOSPITAL | Age: 67
LOS: 1 days | End: 2022-06-24
Payer: COMMERCIAL

## 2022-06-24 DIAGNOSIS — N60.19 DIFFUSE CYSTIC MASTOPATHY OF UNSPECIFIED BREAST: ICD-10-CM

## 2022-06-24 DIAGNOSIS — Z00.8 ENCOUNTER FOR OTHER GENERAL EXAMINATION: ICD-10-CM

## 2022-06-24 DIAGNOSIS — Z00.00 ENCOUNTER FOR GENERAL ADULT MEDICAL EXAMINATION WITHOUT ABNORMAL FINDINGS: ICD-10-CM

## 2022-06-24 PROCEDURE — 77063 BREAST TOMOSYNTHESIS BI: CPT | Mod: 26

## 2022-06-24 PROCEDURE — 76641 ULTRASOUND BREAST COMPLETE: CPT | Mod: 26,50

## 2022-06-24 PROCEDURE — 76641 ULTRASOUND BREAST COMPLETE: CPT

## 2022-06-24 PROCEDURE — 77067 SCR MAMMO BI INCL CAD: CPT | Mod: 26

## 2022-06-24 PROCEDURE — 77067 SCR MAMMO BI INCL CAD: CPT

## 2022-06-24 PROCEDURE — 77063 BREAST TOMOSYNTHESIS BI: CPT

## 2022-06-27 RX ORDER — ATORVASTATIN CALCIUM 20 MG/1
20 TABLET, FILM COATED ORAL
Qty: 90 | Refills: 3 | Status: ACTIVE | COMMUNITY
Start: 2021-05-01 | End: 1900-01-01

## 2022-08-16 ENCOUNTER — APPOINTMENT (OUTPATIENT)
Dept: INTERNAL MEDICINE | Facility: CLINIC | Age: 67
End: 2022-08-16

## 2022-08-16 VITALS
SYSTOLIC BLOOD PRESSURE: 140 MMHG | OXYGEN SATURATION: 99 % | HEART RATE: 67 BPM | HEIGHT: 59 IN | BODY MASS INDEX: 19.96 KG/M2 | DIASTOLIC BLOOD PRESSURE: 80 MMHG | WEIGHT: 99 LBS

## 2022-08-16 DIAGNOSIS — F43.21 ADJUSTMENT DISORDER WITH DEPRESSED MOOD: ICD-10-CM

## 2022-08-16 DIAGNOSIS — Z13.39 ENCOUNTER FOR SCREENING EXAM FOR OTHER MENTAL HEALTH AND BEHAVIORAL DISORDERS: ICD-10-CM

## 2022-08-16 PROCEDURE — G0442 ANNUAL ALCOHOL SCREEN 15 MIN: CPT

## 2022-08-16 PROCEDURE — 99397 PER PM REEVAL EST PAT 65+ YR: CPT

## 2022-08-16 PROCEDURE — G0444 DEPRESSION SCREEN ANNUAL: CPT | Mod: 59

## 2022-08-16 NOTE — ASSESSMENT
[FreeTextEntry1] : \par \par # HM\par -COVID:7/23/22 Moderna, Fully vaccinated and 2 booster shot.\par -Tdap: 8/1/17 utd, next due 2027\par -Shingles: no record, 2019 we offered shingrix but pt did not recall. She will update us after checking with her pharmacy. hx of infection with shingle at age 49. \par -Pneumovax: 2 shots, 12/8/21 Prevnar 13, 12/14/18 polysaccharide 23, utd\par -Retinal exam yearly: 2 years ago. no vision change\par -mammo:6/24/22 Birad 2, \par -pap smear:s/p SANDRA n/a\par -bone density:5/19/21\par Spine and femoral neck: osteoporosis\par Total hip: osteopenia\par fracture risk was above average, medical tx is recommended. Repeat test in 2 years. Due 5/2023.\par -colonoscopy:2/25/19 by Dr. Wyatt [facility], 3 polyps removed. Recommended to repeat surveillance in 5 years. Due 2/2024. No GI issues.\par \par # moderate persistent asthma\par positive TB gold test\par Continue current tx with albuteril prn, Wixela 250/50 1puff bid. \par \par # HTN\par BP is acceptable as 140/80.\par Continue current management including low salt diet and regular exercise.\par Take Lisinopril 2.5mg po qd.\par \par # mild depression\par PHQ9=7, no functional difficulty. \par Denied SI/HI.\par Offered our BH services and pt did not wish at this time. \par \par # HLD\par Tolerating statin well.\par Continue take Atorvastatin 20mg po qd and low fat diet.\par \par # osteoporosis\par Continue take Alendronate 70mg po q weekly.\par \par # Hypothyroidism\par Clinically euthyroid.\par Continue take Levothyroxine 25mcg po qd.\par Check TSH level.\par \par check lab as planned\par RTO in 6month for BP

## 2022-08-16 NOTE — HISTORY OF PRESENT ILLNESS
[FreeTextEntry1] : annual [de-identified] : EVERETTE SYLVESTER  is a 66 year old female  with history of  moderate persistent asthma, positive TB gold test , HTN, HLD, fibrocystic breast disease, aseptic meningitis, osteoporosis, colonic polyp presented today for comprehensive evaluation. Seen by Dr. Jenkins on 4/20/21 for CPE.\par  She was born in [place] and moved to the  in 1986 when she was 31. 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 Lives with , daughter, a 4 y.o. granddaughter, daughter's . Reported being medically stable.\par \par Reviewed note by Dana Gallegos on 12/8/21. Reporting stable condition, last week she needed only one time rescue inhaler and regularly using Wixela 250/50 1puff bid. \par Been with less appetite and body weight is stable as 104-99 lbs since 2017, today's BMI 20.\par exercise: barely dose\par diet: eating home cooked food.  trying to eat healthy.\par Denied fever, chills,CP, SOB, abdominal pain, n/v/c/d.

## 2022-08-16 NOTE — HEALTH RISK ASSESSMENT
[Good] : ~his/her~  mood as  good [Never] : Never [Yes] : Yes [Monthly or less (1 pt)] : Monthly or less (1 point) [1 or 2 (0 pts)] : 1 or 2 (0 points) [Never (0 pts)] : Never (0 points) [No] : In the past 12 months have you used drugs other than those required for medical reasons? No [No falls in past year] : Patient reported no falls in the past year [None] : None [With Family] : lives with family [] :  [Sexually Active] : sexually active [Feels Safe at Home] : Feels safe at home [Fully functional (bathing, dressing, toileting, transferring, walking, feeding)] : Fully functional (bathing, dressing, toileting, transferring, walking, feeding) [Fully functional (using the telephone, shopping, preparing meals, housekeeping, doing laundry, using] : Fully functional and needs no help or supervision to perform IADLs (using the telephone, shopping, preparing meals, housekeeping, doing laundry, using transportation, managing medications and managing finances) [Smoke Detector] : smoke detector [Carbon Monoxide Detector] : carbon monoxide detector [Seat Belt] :  uses seat belt [Sunscreen] : uses sunscreen [Patient/Caregiver not ready to engage] : , patient/caregiver not ready to engage [No Retinopathy] : No retinopathy [Patient reported mammogram was normal] : Patient reported mammogram was normal [Patient reported bone density results were abnormal] : Patient reported bone density results were abnormal [Patient reported colonoscopy was abnormal] : Patient reported colonoscopy was abnormal [2] : 1) Little interest or pleasure doing things for more than half of the days (2) [1] : 2) Feeling down, depressed, or hopeless for several days (1) [PHQ-2 Positive] : PHQ-2 Positive [1/2 of Days or More (2)] : 5.) Poor appetite or overeating? Half the days or more [Several Days (1)] : 6.) Feeling bad about yourself, or that you are a failure, or have let yourself or your family down? Several days [Not at All (0)] : 8.) Moving or speaking so slowly that other people could have noticed, or the opposite, moving or speaking faster than usual? Not at all [Not at all] : How difficult have these problems made it for you to do your work, take care of things at home, or get along with people? Not at all [PHQ-9 Positive] : PHQ-9 Positive [I have developed a follow-up plan documented below in the note.] : I have developed a follow-up plan documented below in the note. [Audit-CScore] : 1 [[id number]] : 3 [[id number]] : 7 [EyeExamDate] : 12/19 [Change in mental status noted] : No change in mental status noted [Language] : denies difficulty with language [High Risk Behavior] : no high risk behavior [Reports changes in hearing] : Reports no changes in hearing [Reports changes in vision] : Reports no changes in vision [Reports changes in dental health] : Reports no changes in dental health [MammogramDate] : 6/22 [MammogramComments] : utd [PapSmearComments] : s/p SANDRA [BoneDensityDate] : 5/21 [BoneDensityComments] : osteoporosis with alrendronate [ColonoscopyDate] : 2/19 [ColonoscopyComments] : 3 polyps removed, f/u 5 year later [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

## 2022-08-18 DIAGNOSIS — E55.9 VITAMIN D DEFICIENCY, UNSPECIFIED: ICD-10-CM

## 2022-08-18 LAB
25(OH)D3 SERPL-MCNC: 25.8 NG/ML
ALBUMIN SERPL ELPH-MCNC: 4.8 G/DL
ALP BLD-CCNC: 75 U/L
ALT SERPL-CCNC: 21 U/L
ANION GAP SERPL CALC-SCNC: 12 MMOL/L
AST SERPL-CCNC: 34 U/L
BASOPHILS # BLD AUTO: 0.04 K/UL
BASOPHILS NFR BLD AUTO: 0.6 %
BILIRUB SERPL-MCNC: 0.8 MG/DL
BUN SERPL-MCNC: 7 MG/DL
CALCIUM SERPL-MCNC: 9.9 MG/DL
CHLORIDE SERPL-SCNC: 90 MMOL/L
CHOLEST SERPL-MCNC: 224 MG/DL
CO2 SERPL-SCNC: 25 MMOL/L
CREAT SERPL-MCNC: 0.72 MG/DL
EGFR: 92 ML/MIN/1.73M2
EOSINOPHIL # BLD AUTO: 0.08 K/UL
EOSINOPHIL NFR BLD AUTO: 1.1 %
ESTIMATED AVERAGE GLUCOSE: 111 MG/DL
GLUCOSE SERPL-MCNC: 109 MG/DL
HBA1C MFR BLD HPLC: 5.5 %
HCT VFR BLD CALC: 37.6 %
HDLC SERPL-MCNC: 113 MG/DL
HGB BLD-MCNC: 12.3 G/DL
IMM GRANULOCYTES NFR BLD AUTO: 0.4 %
LDLC SERPL CALC-MCNC: 95 MG/DL
LYMPHOCYTES # BLD AUTO: 2.53 K/UL
LYMPHOCYTES NFR BLD AUTO: 34.8 %
MAN DIFF?: NORMAL
MCHC RBC-ENTMCNC: 30.8 PG
MCHC RBC-ENTMCNC: 32.7 GM/DL
MCV RBC AUTO: 94 FL
MONOCYTES # BLD AUTO: 0.65 K/UL
MONOCYTES NFR BLD AUTO: 8.9 %
NEUTROPHILS # BLD AUTO: 3.94 K/UL
NEUTROPHILS NFR BLD AUTO: 54.2 %
NONHDLC SERPL-MCNC: 111 MG/DL
PLATELET # BLD AUTO: 310 K/UL
POTASSIUM SERPL-SCNC: 5.4 MMOL/L
PROT SERPL-MCNC: 7.4 G/DL
RBC # BLD: 4 M/UL
RBC # FLD: 13.6 %
SODIUM SERPL-SCNC: 127 MMOL/L
T4 FREE SERPL-MCNC: 1.2 NG/DL
TRIGL SERPL-MCNC: 76 MG/DL
TSH SERPL-ACNC: 2.05 UIU/ML
WBC # FLD AUTO: 7.27 K/UL

## 2022-08-18 RX ORDER — MULTIVIT-MIN/IRON/FOLIC ACID/K 18-600-40
50 MCG CAPSULE ORAL
Qty: 30 | Refills: 0 | Status: ACTIVE | COMMUNITY
Start: 2022-08-18

## 2022-08-22 ENCOUNTER — NON-APPOINTMENT (OUTPATIENT)
Age: 67
End: 2022-08-22

## 2022-08-22 LAB
ANION GAP SERPL CALC-SCNC: 14 MMOL/L
BUN SERPL-MCNC: 8 MG/DL
CALCIUM SERPL-MCNC: 9.3 MG/DL
CHLORIDE SERPL-SCNC: 92 MMOL/L
CO2 SERPL-SCNC: 21 MMOL/L
CREAT SERPL-MCNC: 0.69 MG/DL
CREAT SPEC-SCNC: 26 MG/DL
EGFR: 95 ML/MIN/1.73M2
GLUCOSE SERPL-MCNC: 105 MG/DL
OSMOLALITY SERPL: 267 MOSMOL/KG
OSMOLALITY UR: 222 MOSM/KG
POTASSIUM SERPL-SCNC: 5.4 MMOL/L
SODIUM ?TM SUB UR QN: <20 MMOL/L
SODIUM SERPL-SCNC: 127 MMOL/L

## 2022-08-23 ENCOUNTER — APPOINTMENT (OUTPATIENT)
Dept: NEPHROLOGY | Facility: CLINIC | Age: 67
End: 2022-08-23

## 2022-08-23 VITALS
BODY MASS INDEX: 20.16 KG/M2 | HEIGHT: 59 IN | OXYGEN SATURATION: 99 % | HEART RATE: 72 BPM | WEIGHT: 100 LBS | SYSTOLIC BLOOD PRESSURE: 171 MMHG | TEMPERATURE: 97.3 F | DIASTOLIC BLOOD PRESSURE: 72 MMHG

## 2022-08-23 PROCEDURE — 99203 OFFICE O/P NEW LOW 30 MIN: CPT

## 2022-08-23 RX ORDER — LISINOPRIL 2.5 MG/1
2.5 TABLET ORAL DAILY
Qty: 90 | Refills: 3 | Status: DISCONTINUED | COMMUNITY
Start: 2021-04-20 | End: 2022-08-23

## 2022-08-23 NOTE — PHYSICAL EXAM
[General Appearance - Alert] : alert [General Appearance - In No Acute Distress] : in no acute distress [Sclera] : the sclera and conjunctiva were normal [Outer Ear] : the ears and nose were normal in appearance [Neck Appearance] : the appearance of the neck was normal [Neck Cervical Mass (___cm)] : no neck mass was observed [Jugular Venous Distention Increased] : there was no jugular-venous distention [Respiration, Rhythm And Depth] : normal respiratory rhythm and effort [Exaggerated Use Of Accessory Muscles For Inspiration] : no accessory muscle use [Auscultation Breath Sounds / Voice Sounds] : lungs were clear to auscultation bilaterally [Apical Impulse] : the apical impulse was normal [Heart Rate And Rhythm] : heart rate was normal and rhythm regular [Heart Sounds] : normal S1 and S2 [Heart Sounds Gallop] : no gallops [Edema] : there was no peripheral edema [Bowel Sounds] : normal bowel sounds [Abdomen Soft] : soft [Abdomen Tenderness] : non-tender [] : no hepato-splenomegaly [Cervical Lymph Nodes Enlarged Posterior Bilaterally] : posterior cervical [Cervical Lymph Nodes Enlarged Anterior Bilaterally] : anterior cervical [Supraclavicular Lymph Nodes Enlarged Bilaterally] : supraclavicular [No CVA Tenderness] : no ~M costovertebral angle tenderness [No Spinal Tenderness] : no spinal tenderness [Abnormal Walk] : normal gait [Musculoskeletal - Swelling] : no joint swelling seen [Skin Color & Pigmentation] : normal skin color and pigmentation [Oriented To Time, Place, And Person] : oriented to person, place, and time

## 2022-08-24 LAB
25(OH)D3 SERPL-MCNC: 21.9 NG/ML
ALBUMIN SERPL ELPH-MCNC: 4.7 G/DL
ANION GAP SERPL CALC-SCNC: 15 MMOL/L
APPEARANCE: CLEAR
BACTERIA: NEGATIVE
BILIRUBIN URINE: NEGATIVE
BLOOD URINE: NEGATIVE
BUN SERPL-MCNC: 8 MG/DL
CALCIUM SERPL-MCNC: 9.4 MG/DL
CHLORIDE SERPL-SCNC: 94 MMOL/L
CO2 SERPL-SCNC: 24 MMOL/L
COLOR: COLORLESS
CORTIS SERPL-MCNC: 13.8 UG/DL
CREAT SERPL-MCNC: 0.68 MG/DL
EGFR: 95 ML/MIN/1.73M2
GLUCOSE QUALITATIVE U: NEGATIVE
GLUCOSE SERPL-MCNC: 101 MG/DL
HYALINE CASTS: 0 /LPF
KETONES URINE: NEGATIVE
LEUKOCYTE ESTERASE URINE: NEGATIVE
MICROSCOPIC-UA: NORMAL
NITRITE URINE: NEGATIVE
PH URINE: 7
PHOSPHATE SERPL-MCNC: 3.5 MG/DL
POTASSIUM SERPL-SCNC: 4.5 MMOL/L
PROTEIN URINE: NEGATIVE
RED BLOOD CELLS URINE: 0 /HPF
SODIUM ?TM SUB UR QN: 32 MMOL/L
SODIUM SERPL-SCNC: 133 MMOL/L
SPECIFIC GRAVITY URINE: 1
SQUAMOUS EPITHELIAL CELLS: 0 /HPF
UROBILINOGEN URINE: NORMAL
WHITE BLOOD CELLS URINE: 0 /HPF

## 2022-08-24 NOTE — CONSULT LETTER
[Dear  ___] : Dear  [unfilled], [Consult Letter:] : I had the pleasure of evaluating your patient, [unfilled]. [Consult Closing:] : Thank you very much for allowing me to participate in the care of this patient.  If you have any questions, please do not hesitate to contact me. [Sincerely,] : Sincerely, [FreeTextEntry2] : Dr Lucinda Jenkins [FreeTextEntry1] : A case of chronic asthma, hyperlipidemia, hypertension, hypothyroidism noted to have hyponatremia and borderline hyperkalemia. Recent serum sodium 127 mEq/L and K 5.4 mEq/L, serum osmolality 267 mOsm and urine osmolality 222 mOsm and random urine sodium less than 20 mEq/L (Aug 19, 2022). Patient was started on salt tablet, however,patient has not started. Patient is not eating much and salt intake is low. \par Since urine sodium is low it is unlikely to be SIADH. Hyperkalemia may be related to be low urine output and  being on ACE inhibitor.  Advised  serum cortisol levels, Vit D levels and renal sonogram. \par lab tests discussed with patient's  . Serum sodium is better 133 mEq/L without salt tablet. Serum cortisol level is normal. Advised to increase fluid intake and extra salt in diet. Advised BMP after one week. [FreeTextEntry3] : MARY rodaret

## 2022-08-24 NOTE — HISTORY OF PRESENT ILLNESS
[FreeTextEntry1] : A case of chronic asthma, hyperlipidemia, hypertension noted to have hyponatremia and borderline hyperkalemia. Recent serum sodium 127 mEq/L and K 5.4 mEq/L, serum osmolality 267 mOsm and urine osmolality 222 mOsm and random urine sodium less than 20 mEq/L (Aug 19, 2022). Patient was started on salt tablet, however,patient has not started.

## 2022-08-24 NOTE — REVIEW OF SYSTEMS
[Feeling Tired] : feeling tired [Eyesight Problems] : eyesight problems [Wheezing] : wheezing [SOB on Exertion] : shortness of breath during exertion [Heartburn] : heartburn [Dizziness] : dizziness [Anxiety] : anxiety [Recent Weight Gain (___ Lbs)] : no recent weight gain [Recent Weight Loss (___ Lbs)] : no recent weight loss [Loss Of Hearing] : no hearing loss [Palpitations] : no palpitations [Lower Ext Edema] : no lower extremity edema [Constipation] : no constipation [Diarrhea] : no diarrhea [Joint Swelling] : no joint swelling [Joint Stiffness] : no joint stiffness [Itching] : no itching [Fainting] : no fainting [Depression] : no depression [Muscle Weakness] : no muscle weakness [Easy Bleeding] : no tendency for easy bleeding [Easy Bruising] : no tendency for easy bruising

## 2022-08-24 NOTE — ASSESSMENT
[FreeTextEntry1] : A case of chronic asthma, hyperlipidemia, hypertension, hypothyroidism noted to have hyponatremia and borderline hyperkalemia. Recent serum sodium 127 mEq/L and K 5.4 mEq/L, serum osmolality 267 mOsm and urine osmolality 222 mOsm and random urine sodium less than 20 mEq/L (Aug 19, 2022). Patient was started on salt tablet, however,patient has not started. Patient is not eating much and salt intake is low. \par Since urine sodium is low it is unlikely to be SIADH. Hyperkalemia may be related to be low urine output and  being on ACE inhibitor.  Advised  serum cortisol levels, Vit D levels and renal sonogram. \par lab tests discussed with patient's  . Serum sodium is better 133 mEq/L without salt tablet. Serum cortisol level is normal. Advised to increase fluid intake and extra salt in diet. Advised BMP after one week.

## 2022-08-25 ENCOUNTER — OUTPATIENT (OUTPATIENT)
Dept: OUTPATIENT SERVICES | Facility: HOSPITAL | Age: 67
LOS: 1 days | End: 2022-08-25
Payer: COMMERCIAL

## 2022-08-25 ENCOUNTER — APPOINTMENT (OUTPATIENT)
Dept: ULTRASOUND IMAGING | Facility: IMAGING CENTER | Age: 67
End: 2022-08-25

## 2022-08-25 DIAGNOSIS — I10 ESSENTIAL (PRIMARY) HYPERTENSION: ICD-10-CM

## 2022-08-25 DIAGNOSIS — Z00.8 ENCOUNTER FOR OTHER GENERAL EXAMINATION: ICD-10-CM

## 2022-08-25 PROCEDURE — 76775 US EXAM ABDO BACK WALL LIM: CPT | Mod: 26

## 2022-08-25 PROCEDURE — 76775 US EXAM ABDO BACK WALL LIM: CPT

## 2022-10-25 ENCOUNTER — NON-APPOINTMENT (OUTPATIENT)
Age: 67
End: 2022-10-25

## 2022-10-28 ENCOUNTER — NON-APPOINTMENT (OUTPATIENT)
Age: 67
End: 2022-10-28

## 2022-12-15 RX ORDER — FLUTICASONE PROPIONATE AND SALMETEROL 250; 50 UG/1; UG/1
250-50 POWDER RESPIRATORY (INHALATION)
Qty: 3 | Refills: 1 | Status: ACTIVE | COMMUNITY
Start: 2020-03-12 | End: 1900-01-01

## 2022-12-16 RX ORDER — NORMAL SALT TABLETS 1 G/G
1 TABLET ORAL
Qty: 60 | Refills: 0 | Status: DISCONTINUED | COMMUNITY
Start: 2022-08-22 | End: 2022-12-16

## 2022-12-20 ENCOUNTER — NON-APPOINTMENT (OUTPATIENT)
Age: 67
End: 2022-12-20

## 2022-12-28 ENCOUNTER — NON-APPOINTMENT (OUTPATIENT)
Age: 67
End: 2022-12-28

## 2023-01-12 ENCOUNTER — LABORATORY RESULT (OUTPATIENT)
Age: 68
End: 2023-01-12

## 2023-01-12 ENCOUNTER — APPOINTMENT (OUTPATIENT)
Dept: NEPHROLOGY | Facility: CLINIC | Age: 68
End: 2023-01-12
Payer: COMMERCIAL

## 2023-01-12 VITALS
DIASTOLIC BLOOD PRESSURE: 61 MMHG | TEMPERATURE: 97.6 F | BODY MASS INDEX: 20.56 KG/M2 | OXYGEN SATURATION: 97 % | SYSTOLIC BLOOD PRESSURE: 142 MMHG | WEIGHT: 102 LBS | HEART RATE: 64 BPM | HEIGHT: 59 IN

## 2023-01-12 DIAGNOSIS — E87.1 HYPO-OSMOLALITY AND HYPONATREMIA: ICD-10-CM

## 2023-01-12 DIAGNOSIS — E87.5 HYPERKALEMIA: ICD-10-CM

## 2023-01-12 PROCEDURE — 99213 OFFICE O/P EST LOW 20 MIN: CPT

## 2023-01-13 LAB
ALBUMIN SERPL ELPH-MCNC: 4.2 G/DL
ANION GAP SERPL CALC-SCNC: 10 MMOL/L
APPEARANCE: CLEAR
BASOPHILS # BLD AUTO: 0.04 K/UL
BASOPHILS NFR BLD AUTO: 0.6 %
BILIRUBIN URINE: NEGATIVE
BLOOD URINE: NEGATIVE
BUN SERPL-MCNC: 12 MG/DL
CALCIUM SERPL-MCNC: 9.2 MG/DL
CHLORIDE SERPL-SCNC: 99 MMOL/L
CO2 SERPL-SCNC: 26 MMOL/L
COLOR: NORMAL
CREAT SERPL-MCNC: 0.76 MG/DL
EGFR: 85 ML/MIN/1.73M2
EOSINOPHIL # BLD AUTO: 0.13 K/UL
EOSINOPHIL NFR BLD AUTO: 2 %
GLUCOSE QUALITATIVE U: NEGATIVE
GLUCOSE SERPL-MCNC: 92 MG/DL
HCT VFR BLD CALC: 33.7 %
HGB BLD-MCNC: 11.1 G/DL
IMM GRANULOCYTES NFR BLD AUTO: 0.3 %
KETONES URINE: NEGATIVE
LEUKOCYTE ESTERASE URINE: ABNORMAL
LYMPHOCYTES # BLD AUTO: 2.97 K/UL
LYMPHOCYTES NFR BLD AUTO: 44.7 %
MAN DIFF?: NORMAL
MCHC RBC-ENTMCNC: 31 PG
MCHC RBC-ENTMCNC: 32.9 GM/DL
MCV RBC AUTO: 94.1 FL
MONOCYTES # BLD AUTO: 0.52 K/UL
MONOCYTES NFR BLD AUTO: 7.8 %
NEUTROPHILS # BLD AUTO: 2.97 K/UL
NEUTROPHILS NFR BLD AUTO: 44.6 %
NITRITE URINE: NEGATIVE
PH URINE: 6.5
PHOSPHATE SERPL-MCNC: 3.7 MG/DL
PLATELET # BLD AUTO: 325 K/UL
POTASSIUM SERPL-SCNC: 4.7 MMOL/L
PROTEIN URINE: NEGATIVE
RBC # BLD: 3.58 M/UL
RBC # FLD: 13.9 %
SODIUM SERPL-SCNC: 135 MMOL/L
SPECIFIC GRAVITY URINE: 1.01
UROBILINOGEN URINE: NORMAL
WBC # FLD AUTO: 6.65 K/UL

## 2023-04-24 NOTE — HISTORY OF PRESENT ILLNESS
[FreeTextEntry1] : A case of chronic asthma, hyperlipidemia, hypertension noted to have hyponatremia and borderline hyperkalemia.  Patient was started on salt tablet and being followed for hyponatremia and hyperkalemia.

## 2023-04-24 NOTE — ASSESSMENT
[FreeTextEntry1] : A case of chronic asthma, hyperlipidemia, hypertension noted to have hyponatremia and borderline hyperkalemia.  Patient was started on salt tablet and being followed for hyponatremia and hyperkalemia.\par Weight is stable and BP is controlled.\par Advised renal panel, CBC and urine analysis.\par Lab tests discussed with the patient. Renal function stable. Serum sodium and potassium are within acceptable range. RTC six months.

## 2023-07-02 RX ORDER — LEVOTHYROXINE SODIUM 0.03 MG/1
25 TABLET ORAL DAILY
Qty: 90 | Refills: 3 | Status: ACTIVE | COMMUNITY
Start: 1900-01-01 | End: 1900-01-01

## 2023-07-03 ENCOUNTER — APPOINTMENT (OUTPATIENT)
Dept: NEPHROLOGY | Facility: CLINIC | Age: 68
End: 2023-07-03

## 2023-07-10 ENCOUNTER — APPOINTMENT (OUTPATIENT)
Dept: NEPHROLOGY | Facility: CLINIC | Age: 68
End: 2023-07-10

## 2023-09-22 ENCOUNTER — APPOINTMENT (OUTPATIENT)
Dept: INTERNAL MEDICINE | Facility: CLINIC | Age: 68
End: 2023-09-22
Payer: COMMERCIAL

## 2023-09-22 ENCOUNTER — OUTPATIENT (OUTPATIENT)
Dept: OUTPATIENT SERVICES | Facility: HOSPITAL | Age: 68
LOS: 1 days | End: 2023-09-22
Payer: COMMERCIAL

## 2023-09-22 VITALS
DIASTOLIC BLOOD PRESSURE: 60 MMHG | HEIGHT: 59 IN | HEART RATE: 77 BPM | WEIGHT: 105 LBS | OXYGEN SATURATION: 98 % | SYSTOLIC BLOOD PRESSURE: 130 MMHG | BODY MASS INDEX: 21.17 KG/M2

## 2023-09-22 DIAGNOSIS — Z00.00 ENCOUNTER FOR GENERAL ADULT MEDICAL EXAMINATION W/OUT ABNORMAL FINDINGS: ICD-10-CM

## 2023-09-22 DIAGNOSIS — I10 ESSENTIAL (PRIMARY) HYPERTENSION: ICD-10-CM

## 2023-09-22 DIAGNOSIS — J45.909 UNSPECIFIED ASTHMA, UNCOMPLICATED: ICD-10-CM

## 2023-09-22 DIAGNOSIS — N60.19 DIFFUSE CYSTIC MASTOPATHY OF UNSPECIFIED BREAST: ICD-10-CM

## 2023-09-22 DIAGNOSIS — Z13.31 ENCOUNTER FOR SCREENING FOR DEPRESSION: ICD-10-CM

## 2023-09-22 PROCEDURE — G0444 DEPRESSION SCREEN ANNUAL: CPT

## 2023-09-22 PROCEDURE — G0439: CPT

## 2023-09-22 PROCEDURE — 90662 IIV NO PRSV INCREASED AG IM: CPT

## 2023-09-22 PROCEDURE — G0008: CPT

## 2023-09-22 RX ORDER — LISINOPRIL 2.5 MG/1
2.5 TABLET ORAL DAILY
Qty: 90 | Refills: 3 | Status: DISCONTINUED | COMMUNITY
Start: 2022-12-20 | End: 2023-09-22

## 2023-09-23 LAB
25(OH)D3 SERPL-MCNC: 35.7 NG/ML
ALBUMIN SERPL ELPH-MCNC: 4.9 G/DL
ALP BLD-CCNC: 71 U/L
ALT SERPL-CCNC: 28 U/L
ANION GAP SERPL CALC-SCNC: 13 MMOL/L
AST SERPL-CCNC: 31 U/L
BILIRUB SERPL-MCNC: 0.5 MG/DL
BUN SERPL-MCNC: 11 MG/DL
CALCIUM SERPL-MCNC: 9.9 MG/DL
CHLORIDE SERPL-SCNC: 95 MMOL/L
CHOLEST SERPL-MCNC: 219 MG/DL
CO2 SERPL-SCNC: 24 MMOL/L
CREAT SERPL-MCNC: 0.73 MG/DL
EGFR: 90 ML/MIN/1.73M2
ESTIMATED AVERAGE GLUCOSE: 120 MG/DL
GLUCOSE SERPL-MCNC: 92 MG/DL
HBA1C MFR BLD HPLC: 5.8 %
HCT VFR BLD CALC: 39.3 %
HDLC SERPL-MCNC: 102 MG/DL
HGB BLD-MCNC: 12.6 G/DL
LDLC SERPL CALC-MCNC: 100 MG/DL
MCHC RBC-ENTMCNC: 30.3 PG
MCHC RBC-ENTMCNC: 32.1 GM/DL
MCV RBC AUTO: 94.5 FL
NONHDLC SERPL-MCNC: 117 MG/DL
PLATELET # BLD AUTO: 336 K/UL
POTASSIUM SERPL-SCNC: 5.1 MMOL/L
PROT SERPL-MCNC: 8.1 G/DL
RBC # BLD: 4.16 M/UL
RBC # FLD: 14.3 %
SODIUM SERPL-SCNC: 132 MMOL/L
TRIGL SERPL-MCNC: 99 MG/DL
TSH SERPL-ACNC: 1.95 UIU/ML
VIT B12 SERPL-MCNC: 433 PG/ML
WBC # FLD AUTO: 7.94 K/UL

## 2023-09-25 DIAGNOSIS — E78.5 HYPERLIPIDEMIA, UNSPECIFIED: ICD-10-CM

## 2023-09-25 DIAGNOSIS — Z23 ENCOUNTER FOR IMMUNIZATION: ICD-10-CM

## 2023-09-25 DIAGNOSIS — Z13.31 ENCOUNTER FOR SCREENING FOR DEPRESSION: ICD-10-CM

## 2023-09-25 DIAGNOSIS — M81.0 AGE-RELATED OSTEOPOROSIS WITHOUT CURRENT PATHOLOGICAL FRACTURE: ICD-10-CM

## 2023-09-25 DIAGNOSIS — J45.909 UNSPECIFIED ASTHMA, UNCOMPLICATED: ICD-10-CM

## 2023-09-25 DIAGNOSIS — N60.19 DIFFUSE CYSTIC MASTOPATHY OF UNSPECIFIED BREAST: ICD-10-CM

## 2023-09-25 DIAGNOSIS — Z00.00 ENCOUNTER FOR GENERAL ADULT MEDICAL EXAMINATION WITHOUT ABNORMAL FINDINGS: ICD-10-CM

## 2023-11-15 ENCOUNTER — APPOINTMENT (OUTPATIENT)
Dept: RADIOLOGY | Facility: IMAGING CENTER | Age: 68
End: 2023-11-15
Payer: COMMERCIAL

## 2023-11-15 ENCOUNTER — RESULT REVIEW (OUTPATIENT)
Age: 68
End: 2023-11-15

## 2023-11-15 ENCOUNTER — APPOINTMENT (OUTPATIENT)
Dept: ULTRASOUND IMAGING | Facility: IMAGING CENTER | Age: 68
End: 2023-11-15
Payer: COMMERCIAL

## 2023-11-15 ENCOUNTER — APPOINTMENT (OUTPATIENT)
Dept: MAMMOGRAPHY | Facility: IMAGING CENTER | Age: 68
End: 2023-11-15
Payer: COMMERCIAL

## 2023-11-15 ENCOUNTER — OUTPATIENT (OUTPATIENT)
Dept: OUTPATIENT SERVICES | Facility: HOSPITAL | Age: 68
LOS: 1 days | End: 2023-11-15
Payer: COMMERCIAL

## 2023-11-15 DIAGNOSIS — Z00.8 ENCOUNTER FOR OTHER GENERAL EXAMINATION: ICD-10-CM

## 2023-11-15 PROCEDURE — 77080 DXA BONE DENSITY AXIAL: CPT

## 2023-11-15 PROCEDURE — 76641 ULTRASOUND BREAST COMPLETE: CPT | Mod: 26,50

## 2023-11-15 PROCEDURE — 77080 DXA BONE DENSITY AXIAL: CPT | Mod: 26

## 2023-11-15 PROCEDURE — 77067 SCR MAMMO BI INCL CAD: CPT | Mod: 26

## 2023-11-15 PROCEDURE — 77063 BREAST TOMOSYNTHESIS BI: CPT | Mod: 26

## 2023-11-15 PROCEDURE — 77067 SCR MAMMO BI INCL CAD: CPT

## 2023-11-15 PROCEDURE — 76641 ULTRASOUND BREAST COMPLETE: CPT

## 2023-11-15 PROCEDURE — 77063 BREAST TOMOSYNTHESIS BI: CPT

## 2023-11-21 ENCOUNTER — TRANSCRIPTION ENCOUNTER (OUTPATIENT)
Age: 68
End: 2023-11-21

## 2023-12-22 ENCOUNTER — NON-APPOINTMENT (OUTPATIENT)
Age: 68
End: 2023-12-22

## 2024-04-10 RX ORDER — ALBUTEROL SULFATE 90 UG/1
108 (90 BASE) INHALANT RESPIRATORY (INHALATION)
Qty: 1 | Refills: 3 | Status: ACTIVE | COMMUNITY
Start: 2019-02-05 | End: 1900-01-01

## 2024-04-26 ENCOUNTER — APPOINTMENT (OUTPATIENT)
Dept: INTERNAL MEDICINE | Facility: CLINIC | Age: 69
End: 2024-04-26
Payer: COMMERCIAL

## 2024-04-26 ENCOUNTER — OUTPATIENT (OUTPATIENT)
Dept: OUTPATIENT SERVICES | Facility: HOSPITAL | Age: 69
LOS: 1 days | End: 2024-04-26
Payer: COMMERCIAL

## 2024-04-26 VITALS
HEART RATE: 71 BPM | WEIGHT: 109 LBS | HEIGHT: 59 IN | SYSTOLIC BLOOD PRESSURE: 140 MMHG | BODY MASS INDEX: 21.97 KG/M2 | OXYGEN SATURATION: 99 % | DIASTOLIC BLOOD PRESSURE: 76 MMHG

## 2024-04-26 DIAGNOSIS — E78.5 HYPERLIPIDEMIA, UNSPECIFIED: ICD-10-CM

## 2024-04-26 DIAGNOSIS — Z00.00 ENCOUNTER FOR GENERAL ADULT MEDICAL EXAMINATION WITHOUT ABNORMAL FINDINGS: ICD-10-CM

## 2024-04-26 DIAGNOSIS — M81.0 AGE-RELATED OSTEOPOROSIS WITHOUT CURRENT PATHOLOGICAL FRACTURE: ICD-10-CM

## 2024-04-26 DIAGNOSIS — E03.9 HYPOTHYROIDISM, UNSPECIFIED: ICD-10-CM

## 2024-04-26 DIAGNOSIS — M75.51 BURSITIS OF RIGHT SHOULDER: ICD-10-CM

## 2024-04-26 DIAGNOSIS — I10 ESSENTIAL (PRIMARY) HYPERTENSION: ICD-10-CM

## 2024-04-26 DIAGNOSIS — M81.0 AGE-RELATED OSTEOPOROSIS W/OUT CURRENT PATHOLOGICAL FRACTURE: ICD-10-CM

## 2024-04-26 LAB
ALBUMIN SERPL ELPH-MCNC: 4.9 G/DL
ALP BLD-CCNC: 70 U/L
ALT SERPL-CCNC: 24 U/L
ANION GAP SERPL CALC-SCNC: 18 MMOL/L
AST SERPL-CCNC: 33 U/L
BILIRUB SERPL-MCNC: 0.5 MG/DL
BUN SERPL-MCNC: 9 MG/DL
CALCIUM SERPL-MCNC: 9.5 MG/DL
CHLORIDE SERPL-SCNC: 93 MMOL/L
CHOLEST SERPL-MCNC: 214 MG/DL
CO2 SERPL-SCNC: 20 MMOL/L
CREAT SERPL-MCNC: 0.69 MG/DL
EGFR: 94 ML/MIN/1.73M2
ESTIMATED AVERAGE GLUCOSE: 126 MG/DL
GLUCOSE SERPL-MCNC: 89 MG/DL
HBA1C MFR BLD HPLC: 6 %
HDLC SERPL-MCNC: 93 MG/DL
LDLC SERPL CALC-MCNC: 102 MG/DL
NONHDLC SERPL-MCNC: 121 MG/DL
POTASSIUM SERPL-SCNC: 4.3 MMOL/L
PROT SERPL-MCNC: 7.7 G/DL
SODIUM SERPL-SCNC: 131 MMOL/L
T3FREE SERPL-MCNC: 3.09 PG/ML
T4 FREE SERPL-MCNC: 1.2 NG/DL
TRIGL SERPL-MCNC: 112 MG/DL
TSH SERPL-ACNC: 2.55 UIU/ML

## 2024-04-26 PROCEDURE — G0439: CPT

## 2024-04-26 PROCEDURE — 99397 PER PM REEVAL EST PAT 65+ YR: CPT

## 2024-04-26 RX ORDER — MELOXICAM 7.5 MG/1
7.5 TABLET ORAL
Qty: 30 | Refills: 1 | Status: ACTIVE | COMMUNITY
Start: 2024-04-26 | End: 1900-01-01

## 2024-04-26 RX ORDER — ALENDRONATE SODIUM 70 MG/1
70 TABLET ORAL
Qty: 12 | Refills: 3 | Status: ACTIVE | COMMUNITY
Start: 2021-05-26 | End: 1900-01-01

## 2024-04-26 RX ORDER — ATORVASTATIN CALCIUM 20 MG/1
20 TABLET, FILM COATED ORAL
Qty: 90 | Refills: 3 | Status: ACTIVE | COMMUNITY
Start: 2021-05-01 | End: 1900-01-01

## 2024-04-26 RX ORDER — AMLODIPINE BESYLATE 2.5 MG/1
2.5 TABLET ORAL DAILY
Qty: 270 | Refills: 3 | Status: ACTIVE | COMMUNITY
Start: 2022-08-23 | End: 1900-01-01

## 2024-05-07 ENCOUNTER — APPOINTMENT (OUTPATIENT)
Dept: ORTHOPEDIC SURGERY | Facility: CLINIC | Age: 69
End: 2024-05-07
Payer: COMMERCIAL

## 2024-05-07 VITALS
BODY MASS INDEX: 21.97 KG/M2 | SYSTOLIC BLOOD PRESSURE: 157 MMHG | WEIGHT: 109 LBS | HEART RATE: 69 BPM | HEIGHT: 59 IN | DIASTOLIC BLOOD PRESSURE: 71 MMHG

## 2024-05-07 DIAGNOSIS — M75.31 CALCIFIC TENDINITIS OF RIGHT SHOULDER: ICD-10-CM

## 2024-05-07 PROCEDURE — 99204 OFFICE O/P NEW MOD 45 MIN: CPT | Mod: 25

## 2024-05-07 PROCEDURE — 20610 DRAIN/INJ JOINT/BURSA W/O US: CPT | Mod: RT

## 2024-05-07 PROCEDURE — 73030 X-RAY EXAM OF SHOULDER: CPT | Mod: RT

## 2024-05-13 PROBLEM — M75.31 CALCIFIC TENDINITIS OF RIGHT SHOULDER: Status: ACTIVE | Noted: 2024-05-13

## 2024-05-13 NOTE — PHYSICAL EXAM
[de-identified] : The following radiographs were ordered and read by me during this patients visit. I reviewed each radiograph in detail with the patient and discussed the findings as highlighted below.   3 views of right shoulder were obtained today, , that show no acute fracture or dislocation. There is no glenohumeral and mild AC joint degenerative change seen. Type II acromion. There is no significant malalignment. There is a large calcific deposit within the RTC insertion.  [de-identified] : Right shoulder exam:   Inspection: No malalignment, No defects, No atrophy Skin: No masses, No lesions Neck: Negative Spurling, full ROM, no pain with ROM AROM: FF to 90, abduction to 60, ER to 40, IR to mid lumbar PROM:Full Painful arc ROM: Pain with further motion Tenderness: No bicipital tenderness, +posterior shoulder ttp. +tenderness to greater tuberosity/RTC insertion, no anterior shoulder/lesser tuberosity tenderness Strength: 4+/5 ER, 4+/5 IR in adduction, 4/5 supraspinatus testing, negative [place]'s test AC joint: No TTP/pain with cross arm testing Biceps: Speed Negative, Yergason Negative Impingement test: + Moncada, + Neer Vasc: 2+ radial pulse Stability: Stable Neuro: AIN, PIN, Ulnar nerve intact to motor Sensation: Intact to light touch throughout Other findings: None.

## 2024-05-13 NOTE — PROCEDURE
[de-identified] : Injection: Right shoulder (Subacromial). Indication: Calcific tendinitis.   A discussion was had with the patient regarding this procedure and all questions were answered. All risks, benefits and alternatives were discussed. These included but were not limited to bleeding, infection, and allergic reaction. Alcohol was used to clean the skin, and betadine was used to sterilize and prep the area in the posterior aspect of the right shoulder. Ethyl chloride spray was then used as a topical anesthetic. A 21-gauge needle was used to inject 4cc of 1% lidocaine and 1cc of 40mg/ml methylprednisolone into the right subacromial space. A sterile bandage was then applied. The patient tolerated the procedure well and there were no complications.

## 2024-05-13 NOTE — ADDENDUM
[FreeTextEntry1] : This note was written by Jes Rubi on  acting solely as a scribe for Dr. Dhaval Espino.  All medical record entries made by the Scribe were at my, Dr. Dhaval Espino, direction and personally dictated by me on . I have personally reviewed the chart and agree that the record accurately reflects my personal performance of the history, physical exam, assessment and plan.

## 2024-05-13 NOTE — DISCUSSION/SUMMARY
[de-identified] : 67 y/o female with right shoulder calcific tendinitis.   The patient presents today with acute onset of right shoulder pain consistent with a clinical diagnosis of calcific tendinopathy. Radiographs show a calcification at the rotator cuff insertion. Clinically, the patient specific point tenderness to this location as well as positive impingement signs. I discussed the pathophysiology of the diagnosis and the distinction between the resorptive and formative phase.I discussed the treatment of calcific tendinitis with the patient including nonoperative management as first line treatment. Anti-inflammatory medications (NSAID's) with physical therapy for strengthening and conditioning of the joint to preserve shoulder range of motion is typically required. Corticosteroid injection may be indicated for refractory cases and for acute symptomatic pain relief. If nonoperative management fails, arthroscopic debridement with possible rotator cuff repair if damaged during surgery may be required for recalcitrant cases.  Injection therapy was provided for therapeutic and symptomatic relief.  Recommendations: Begin a trial of PT. Rx given. Conservative care & observation, this includes rest/activity avoidance until less symptomatic with subsequent gradual return to full activity as tolerated. Patient may also use OTC NSAID's or acetaminophen as tolerated, with application of ice to the area 2-3x daily for 20 minutes after periods of activity.   Follow up as needed.

## 2024-05-20 PROBLEM — M81.0 OSTEOPOROSIS: Status: ACTIVE | Noted: 2021-05-26

## 2024-05-20 NOTE — HEALTH RISK ASSESSMENT
[Yes] : Yes [2 - 3 times a week (3 pts)] : 2 - 3  times a week (3 points) [No falls in past year] : Patient reported no falls in the past year [0] : 2) Feeling down, depressed, or hopeless: Not at all (0) [PHQ-2 Negative - No further assessment needed] : PHQ-2 Negative - No further assessment needed [Never] : Never [Discussed at today's visit] : Advance Directives Discussed at today's visit [de-identified] : Friday and Sat(2-3 drinks on weekend) [Audit-CScore] : 3 [CEL4Oerom] : 0 [MammogramDate] : 12/18 [[facility]

## 2024-05-20 NOTE — ASSESSMENT
[FreeTextEntry1] : CXR done-no infiltrate but hyperlucent lung fields/positive quant gold/weight loss/cough pulmonary f/u noted sputum for AFB X 3-

## 2024-05-20 NOTE — HISTORY OF PRESENT ILLNESS
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  Here with  Tito. 2024-notes pain in hands -with DJD note=takes no analgesics Has 2 grandhildren, 7yo, 6 months and cares for them. 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 Shingrix x 2 2023 Last Tdap 2016 12/2018- hx quant gold positive, scant cough, nge workup 7/2014 -shingles  on right abdominal wall while in [place] , took antiviral medication.  Admits to increase stress at that time with 20 yo daughter who had drug and alcohol problem. [id number] on ARB/diuretic but dizziness with lower BP, stopped medications and seeing cardiologist Dr Murphy at [facility], on Amlodipine alone, off Lisinopril 2.5mg-no c/o-2023 Hx of hyperlipidemia, on statin-but admits skipping it at least 2-3 tiies a week Hx hypothyroidism, needs labs to check thyroid -euthyroid Hx fibrocystic breast,  mammogram 6/2022-needs rx Hx osteoporosis-on Alendronoate 2021-f/u DEXA Hx hyponatremia-stable -off diuretic Hx of colonic polyp with Dr Wyatt 2012, 2/2019 s/p SANDRA for fibroids. Td 2006, Tdap 2016 Hx asthma -stable Needs flu vx 2023 Will et shingrix in 2024, as she hd shingles in 2014

## 2024-07-09 ENCOUNTER — RX RENEWAL (OUTPATIENT)
Age: 69
End: 2024-07-09

## 2024-09-12 ENCOUNTER — OUTPATIENT (OUTPATIENT)
Dept: OUTPATIENT SERVICES | Facility: HOSPITAL | Age: 69
LOS: 1 days | End: 2024-09-12
Payer: COMMERCIAL

## 2024-09-12 ENCOUNTER — APPOINTMENT (OUTPATIENT)
Dept: INTERNAL MEDICINE | Facility: CLINIC | Age: 69
End: 2024-09-12
Payer: COMMERCIAL

## 2024-09-12 ENCOUNTER — MED ADMIN CHARGE (OUTPATIENT)
Age: 69
End: 2024-09-12

## 2024-09-12 VITALS
OXYGEN SATURATION: 99 % | HEART RATE: 69 BPM | BODY MASS INDEX: 21.57 KG/M2 | WEIGHT: 107 LBS | HEIGHT: 59 IN | SYSTOLIC BLOOD PRESSURE: 140 MMHG | DIASTOLIC BLOOD PRESSURE: 60 MMHG

## 2024-09-12 DIAGNOSIS — M75.31 CALCIFIC TENDINITIS OF RIGHT SHOULDER: ICD-10-CM

## 2024-09-12 DIAGNOSIS — I10 ESSENTIAL (PRIMARY) HYPERTENSION: ICD-10-CM

## 2024-09-12 DIAGNOSIS — E78.5 HYPERLIPIDEMIA, UNSPECIFIED: ICD-10-CM

## 2024-09-12 DIAGNOSIS — M81.0 AGE-RELATED OSTEOPOROSIS W/OUT CURRENT PATHOLOGICAL FRACTURE: ICD-10-CM

## 2024-09-12 DIAGNOSIS — N60.19 DIFFUSE CYSTIC MASTOPATHY OF UNSPECIFIED BREAST: ICD-10-CM

## 2024-09-12 PROCEDURE — 99215 OFFICE O/P EST HI 40 MIN: CPT

## 2024-09-12 PROCEDURE — G0463: CPT

## 2024-09-12 PROCEDURE — G2211 COMPLEX E/M VISIT ADD ON: CPT | Mod: NC

## 2024-09-15 NOTE — HISTORY OF PRESENT ILLNESS
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  Here with  Tito. 2024-reports pain in hands, right shoulder ,takes no analgesics, saw orthoDR Srinivas-calcific tendinitis of the right shoulder with PT twice Has 2 grandhildren, 5yo, 6 months and cares for them. 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 Shingrix x 2 2023 Last Tdap 2016 12/2018- Quant gold positive, scant cough, nge workup 7/2014 -shingles  on right abdominal wall while in [place] , took antiviral medication.  Admits to increase stress at that time with 18 yo daughter who had drug and alcohol problem. [id number] on ARB/diuretic but dizziness with lower BP, stopped medications and seeing cardiologist Dr Murphy at [facility], on Amlodipine alone, off Lisinopril 2.5mg/HCTZ-no c/o-Borderlne BP on 7.5mOD, increase BP today 140-150/60-70 2024-reports beer on weekend-2-3/days on Fri/Sat/Sunday and one meal/day Hx of hyperlipidemia, on statin-but admits skipping it at least 2-3 tiimes a week Hx hypothyroidism, needs labs to check thyroid -euthyroid Hx fibrocystic breast,  mammogram 6/2022-needs rx Hx osteoporosis-on Alendronoate 2021-f/u DEXA in 2025-26 Hx hyponatremia-stable -off diuretic Hx of colonic polyp with Dr Wyatt 2012, 2/2019 s/p SANDRA for fibroids. Td 2006, Tdap 2016 Hx asthma -stable Needs flu vx 2023 Will get shingrix in 2024, as she hd shingles in 2014

## 2024-09-15 NOTE — PHYSICAL EXAM
[Normal] : normal rate, regular rhythm, normal S1 and S2 and no murmur heard [No Edema] : there was no peripheral edema [de-identified] : mild degenerative changes noted in the fingers both,, elbows with normal range and no tenderness in the lateral/medial epicondyles shoulders negative Moncada, negative Neer sign diminished in extreme range in the right shoulder

## 2024-09-15 NOTE — REVIEW OF SYSTEMS
[Joint Pain] : joint pain [Joint Stiffness] : joint stiffness [Negative] : Heme/Lymph [FreeTextEntry9] : hhands/right shoulder

## 2024-09-24 DIAGNOSIS — M75.31 CALCIFIC TENDINITIS OF RIGHT SHOULDER: ICD-10-CM

## 2024-09-24 DIAGNOSIS — M81.0 AGE-RELATED OSTEOPOROSIS WITHOUT CURRENT PATHOLOGICAL FRACTURE: ICD-10-CM

## 2024-09-24 DIAGNOSIS — E78.5 HYPERLIPIDEMIA, UNSPECIFIED: ICD-10-CM

## 2024-10-01 ENCOUNTER — APPOINTMENT (OUTPATIENT)
Dept: ORTHOPEDIC SURGERY | Facility: CLINIC | Age: 69
End: 2024-10-01
Payer: COMMERCIAL

## 2024-10-01 VITALS — WEIGHT: 107 LBS | BODY MASS INDEX: 21.57 KG/M2 | HEIGHT: 59 IN

## 2024-10-01 DIAGNOSIS — M75.31 CALCIFIC TENDINITIS OF RIGHT SHOULDER: ICD-10-CM

## 2024-10-01 PROCEDURE — 99213 OFFICE O/P EST LOW 20 MIN: CPT

## 2024-10-03 NOTE — HISTORY OF PRESENT ILLNESS
[de-identified] : 68-year-old right-handed female presents for follow-up of right shoulder pain. She previously received a steroid injection for calcific tendonitis on 4/25/24, which provided significant relief. The patient did not complete the recommended physical therapy. Today, she reports a change in her symptoms, noting intermittent, sharp pain localized to the biceps area. The pain is different from her prior experience and does not last long, so she is not currently taking pain medication.

## 2024-10-03 NOTE — HISTORY OF PRESENT ILLNESS
[de-identified] : 68-year-old right-handed female presents for follow-up of right shoulder pain. She previously received a steroid injection for calcific tendonitis on 4/25/24, which provided significant relief. The patient did not complete the recommended physical therapy. Today, she reports a change in her symptoms, noting intermittent, sharp pain localized to the biceps area. The pain is different from her prior experience and does not last long, so she is not currently taking pain medication.

## 2024-10-03 NOTE — DISCUSSION/SUMMARY
[de-identified] : 69 y/o female with right shoulder calcific tendinitis.   The patient presents today for follow-up of calcific tendinopathy. I reviewed the pathophysiology of the diagnosis and the distinction between the resorptive and formative phase. I again discussed the treatment of calcific tendinitis that includes anti-inflammatory control (rest, ice, NSAIDs) with physical therapy for strengthening and conditioning to preserve shoulder range of motion.  Recommendations: PT. Rx given. Ice/NSAIDS.  Follow up as needed.

## 2024-10-03 NOTE — DISCUSSION/SUMMARY
[de-identified] : 67 y/o female with right shoulder calcific tendinitis.   The patient presents today for follow-up of calcific tendinopathy. I reviewed the pathophysiology of the diagnosis and the distinction between the resorptive and formative phase. I again discussed the treatment of calcific tendinitis that includes anti-inflammatory control (rest, ice, NSAIDs) with physical therapy for strengthening and conditioning to preserve shoulder range of motion.  Recommendations: PT. Rx given. Ice/NSAIDS.  Follow up as needed.

## 2024-10-03 NOTE — PHYSICAL EXAM
[de-identified] : Right shoulder exam:   Inspection: No malalignment, No defects, No atrophy Skin: No masses, No lesions Neck: Negative Spurling, full ROM, no pain with ROM AROM: FF to 120, abduction to 80, ER to 60, IR to low lumbar PROM:Full Painful arc ROM: Pain with IR Tenderness: No bicipital tenderness, +posterior shoulder ttp. +tenderness to greater tuberosity/RTC insertion, no anterior shoulder/lesser tuberosity tenderness Strength: 4+/5 ER, 4+/5 IR in adduction, 4/5 supraspinatus testing, negative [place]'s test AC joint: No TTP/pain with cross arm testing Biceps: Speed Negative, Yergason Negative Impingement test: + Moncada, + Neer Vasc: 2+ radial pulse Stability: Stable Neuro: AIN, PIN, Ulnar nerve intact to motor Sensation: Intact to light touch throughout Other findings: None.  [de-identified] : The following radiographs were ordered and read by me during this patients visit. I reviewed each radiograph in detail with the patient and discussed the findings as highlighted below.   3 views of right shoulder were obtained 05/07/2024 that show no acute fracture or dislocation. There is no glenohumeral and mild AC joint degenerative change seen. Type II acromion. There is no significant malalignment. There is a large calcific deposit within the RTC insertion.

## 2024-10-03 NOTE — ADDENDUM
[FreeTextEntry1] : This note was written by Elda Manjarrez on 10/01/2024 acting solely as a scribe for Dr. Dhaval Espino.   All medical record entries made by the Scribe were at my, Dr. Dhaval Espino, direction and personally dictated by me on 10/01/2024. I have personally reviewed the chart and agree that the record accurately reflects my personal performance of the history, physical exam, assessment and plan.

## 2024-10-03 NOTE — PHYSICAL EXAM
[de-identified] : Right shoulder exam:   Inspection: No malalignment, No defects, No atrophy Skin: No masses, No lesions Neck: Negative Spurling, full ROM, no pain with ROM AROM: FF to 120, abduction to 80, ER to 60, IR to low lumbar PROM:Full Painful arc ROM: Pain with IR Tenderness: No bicipital tenderness, +posterior shoulder ttp. +tenderness to greater tuberosity/RTC insertion, no anterior shoulder/lesser tuberosity tenderness Strength: 4+/5 ER, 4+/5 IR in adduction, 4/5 supraspinatus testing, negative [place]'s test AC joint: No TTP/pain with cross arm testing Biceps: Speed Negative, Yergason Negative Impingement test: + Moncada, + Neer Vasc: 2+ radial pulse Stability: Stable Neuro: AIN, PIN, Ulnar nerve intact to motor Sensation: Intact to light touch throughout Other findings: None.  [de-identified] : The following radiographs were ordered and read by me during this patients visit. I reviewed each radiograph in detail with the patient and discussed the findings as highlighted below.   3 views of right shoulder were obtained 05/07/2024 that show no acute fracture or dislocation. There is no glenohumeral and mild AC joint degenerative change seen. Type II acromion. There is no significant malalignment. There is a large calcific deposit within the RTC insertion.

## 2024-11-18 ENCOUNTER — RX RENEWAL (OUTPATIENT)
Age: 69
End: 2024-11-18

## 2024-11-25 ENCOUNTER — RX RENEWAL (OUTPATIENT)
Age: 69
End: 2024-11-25

## 2024-11-27 ENCOUNTER — OUTPATIENT (OUTPATIENT)
Dept: OUTPATIENT SERVICES | Facility: HOSPITAL | Age: 69
LOS: 1 days | End: 2024-11-27
Payer: COMMERCIAL

## 2024-11-27 ENCOUNTER — APPOINTMENT (OUTPATIENT)
Dept: RADIOLOGY | Facility: CLINIC | Age: 69
End: 2024-11-27
Payer: COMMERCIAL

## 2024-11-27 DIAGNOSIS — Z00.8 ENCOUNTER FOR OTHER GENERAL EXAMINATION: ICD-10-CM

## 2024-11-27 PROCEDURE — 73630 X-RAY EXAM OF FOOT: CPT

## 2024-11-27 PROCEDURE — 73630 X-RAY EXAM OF FOOT: CPT | Mod: 26,RT

## 2024-12-05 ENCOUNTER — APPOINTMENT (OUTPATIENT)
Dept: INTERNAL MEDICINE | Facility: CLINIC | Age: 69
End: 2024-12-05

## 2024-12-05 ENCOUNTER — OUTPATIENT (OUTPATIENT)
Dept: OUTPATIENT SERVICES | Facility: HOSPITAL | Age: 69
LOS: 1 days | End: 2024-12-05

## 2024-12-05 PROCEDURE — G0463: CPT

## 2024-12-05 PROCEDURE — 99442: CPT

## 2024-12-06 DIAGNOSIS — I10 ESSENTIAL (PRIMARY) HYPERTENSION: ICD-10-CM

## 2025-01-29 ENCOUNTER — RX RENEWAL (OUTPATIENT)
Age: 70
End: 2025-01-29

## 2025-02-13 ENCOUNTER — NON-APPOINTMENT (OUTPATIENT)
Age: 70
End: 2025-02-13

## 2025-04-29 ENCOUNTER — OUTPATIENT (OUTPATIENT)
Dept: OUTPATIENT SERVICES | Facility: HOSPITAL | Age: 70
LOS: 1 days | End: 2025-04-29
Payer: COMMERCIAL

## 2025-04-29 ENCOUNTER — APPOINTMENT (OUTPATIENT)
Dept: INTERNAL MEDICINE | Facility: CLINIC | Age: 70
End: 2025-04-29
Payer: COMMERCIAL

## 2025-04-29 VITALS
HEIGHT: 59 IN | OXYGEN SATURATION: 99 % | DIASTOLIC BLOOD PRESSURE: 60 MMHG | SYSTOLIC BLOOD PRESSURE: 120 MMHG | BODY MASS INDEX: 20.96 KG/M2 | WEIGHT: 104 LBS | HEART RATE: 75 BPM

## 2025-04-29 DIAGNOSIS — M19.041 PRIMARY OSTEOARTHRITIS, RIGHT HAND: ICD-10-CM

## 2025-04-29 DIAGNOSIS — E03.9 HYPOTHYROIDISM, UNSPECIFIED: ICD-10-CM

## 2025-04-29 DIAGNOSIS — R35.0 FREQUENCY OF MICTURITION: ICD-10-CM

## 2025-04-29 DIAGNOSIS — Z00.00 ENCOUNTER FOR GENERAL ADULT MEDICAL EXAMINATION W/OUT ABNORMAL FINDINGS: ICD-10-CM

## 2025-04-29 DIAGNOSIS — M19.042 PRIMARY OSTEOARTHRITIS, RIGHT HAND: ICD-10-CM

## 2025-04-29 DIAGNOSIS — M81.0 AGE-RELATED OSTEOPOROSIS W/OUT CURRENT PATHOLOGICAL FRACTURE: ICD-10-CM

## 2025-04-29 DIAGNOSIS — E78.5 HYPERLIPIDEMIA, UNSPECIFIED: ICD-10-CM

## 2025-04-29 DIAGNOSIS — I10 ESSENTIAL (PRIMARY) HYPERTENSION: ICD-10-CM

## 2025-04-29 PROCEDURE — G0463: CPT

## 2025-04-29 PROCEDURE — 99215 OFFICE O/P EST HI 40 MIN: CPT

## 2025-04-29 RX ORDER — AMLODIPINE BESYLATE 2.5 MG/1
2.5 TABLET ORAL
Qty: 270 | Refills: 3 | Status: ACTIVE | COMMUNITY
Start: 2025-04-29 | End: 1900-01-01

## 2025-05-01 ENCOUNTER — APPOINTMENT (OUTPATIENT)
Dept: ORTHOPEDIC SURGERY | Facility: CLINIC | Age: 70
End: 2025-05-01

## 2025-05-01 DIAGNOSIS — M19.041 PRIMARY OSTEOARTHRITIS, RIGHT HAND: ICD-10-CM

## 2025-05-01 DIAGNOSIS — G56.01 CARPAL TUNNEL SYNDROME, RIGHT UPPER LIMB: ICD-10-CM

## 2025-05-01 DIAGNOSIS — M19.042 PRIMARY OSTEOARTHRITIS, RIGHT HAND: ICD-10-CM

## 2025-05-01 LAB
24R-OH-CALCIDIOL SERPL-MCNC: 63.7 PG/ML
25(OH)D3 SERPL-MCNC: 34.7 NG/ML
ALBUMIN SERPL ELPH-MCNC: 5.1 G/DL
ALP BLD-CCNC: 73 U/L
ALT SERPL-CCNC: 28 U/L
ANION GAP SERPL CALC-SCNC: 16 MMOL/L
APPEARANCE: CLEAR
AST SERPL-CCNC: 32 U/L
BACTERIA UR CULT: NORMAL
BACTERIA: NEGATIVE /HPF
BILIRUB SERPL-MCNC: 0.5 MG/DL
BILIRUBIN URINE: NEGATIVE
BLOOD URINE: NEGATIVE
BUN SERPL-MCNC: 9 MG/DL
CALCIUM SERPL-MCNC: 10.2 MG/DL
CAST: 0 /LPF
CCP AB SER IA-ACNC: <8 U/ML
CHLORIDE SERPL-SCNC: 98 MMOL/L
CHOLEST SERPL-MCNC: 225 MG/DL
CO2 SERPL-SCNC: 23 MMOL/L
COLOR: YELLOW
CREAT SERPL-MCNC: 0.65 MG/DL
EGFRCR SERPLBLD CKD-EPI 2021: 95 ML/MIN/1.73M2
EPITHELIAL CELLS: 0 /HPF
ERYTHROCYTE [SEDIMENTATION RATE] IN BLOOD BY WESTERGREN METHOD: 37 MM/HR
ESTIMATED AVERAGE GLUCOSE: 134 MG/DL
GLUCOSE QUALITATIVE U: NEGATIVE MG/DL
GLUCOSE SERPL-MCNC: 119 MG/DL
HBA1C MFR BLD HPLC: 6.3 %
HCT VFR BLD CALC: 39.5 %
HDLC SERPL-MCNC: 83 MG/DL
HGB BLD-MCNC: 12.6 G/DL
KETONES URINE: NEGATIVE MG/DL
LDLC SERPL DIRECT ASSAY-MCNC: 126 MG/DL
LDLC SERPL-MCNC: 118 MG/DL
LEUKOCYTE ESTERASE URINE: ABNORMAL
MCHC RBC-ENTMCNC: 29.8 PG
MCHC RBC-ENTMCNC: 31.9 G/DL
MCV RBC AUTO: 93.4 FL
MICROSCOPIC-UA: NORMAL
NITRITE URINE: NEGATIVE
NONHDLC SERPL-MCNC: 142 MG/DL
PARATHYROID HORMONE INTACT: 39 PG/ML
PH URINE: 6.5
PLATELET # BLD AUTO: 274 K/UL
POTASSIUM SERPL-SCNC: 4.9 MMOL/L
PROT SERPL-MCNC: 8.3 G/DL
PROTEIN URINE: NEGATIVE MG/DL
RBC # BLD: 4.23 M/UL
RBC # FLD: 14.9 %
RED BLOOD CELLS URINE: 0 /HPF
RF+CCP IGG SER-IMP: NEGATIVE
RHEUMATOID FACT SER QL: <10 IU/ML
SODIUM SERPL-SCNC: 138 MMOL/L
SPECIFIC GRAVITY URINE: 1.01
T4 FREE SERPL-MCNC: 1 NG/DL
TRIGL SERPL-MCNC: 136 MG/DL
TSH SERPL-ACNC: 2.92 UIU/ML
UROBILINOGEN URINE: 0.2 MG/DL
WBC # FLD AUTO: 7.7 K/UL
WHITE BLOOD CELLS URINE: 1 /HPF

## 2025-05-01 PROCEDURE — 20526 THER INJECTION CARP TUNNEL: CPT | Mod: RT

## 2025-05-01 PROCEDURE — 99204 OFFICE O/P NEW MOD 45 MIN: CPT | Mod: 25

## 2025-05-01 PROCEDURE — 73130 X-RAY EXAM OF HAND: CPT | Mod: 50

## 2025-05-05 DIAGNOSIS — E03.9 HYPOTHYROIDISM, UNSPECIFIED: ICD-10-CM

## 2025-05-05 DIAGNOSIS — Z00.00 ENCOUNTER FOR GENERAL ADULT MEDICAL EXAMINATION WITHOUT ABNORMAL FINDINGS: ICD-10-CM

## 2025-05-05 DIAGNOSIS — M19.042 PRIMARY OSTEOARTHRITIS, LEFT HAND: ICD-10-CM

## 2025-05-05 DIAGNOSIS — R35.0 FREQUENCY OF MICTURITION: ICD-10-CM

## 2025-05-05 DIAGNOSIS — E78.5 HYPERLIPIDEMIA, UNSPECIFIED: ICD-10-CM

## 2025-05-05 DIAGNOSIS — M81.0 AGE-RELATED OSTEOPOROSIS WITHOUT CURRENT PATHOLOGICAL FRACTURE: ICD-10-CM

## 2025-05-05 DIAGNOSIS — M19.041 PRIMARY OSTEOARTHRITIS, RIGHT HAND: ICD-10-CM

## 2025-08-30 ENCOUNTER — NON-APPOINTMENT (OUTPATIENT)
Age: 70
End: 2025-08-30